# Patient Record
Sex: FEMALE | Race: WHITE | NOT HISPANIC OR LATINO
[De-identification: names, ages, dates, MRNs, and addresses within clinical notes are randomized per-mention and may not be internally consistent; named-entity substitution may affect disease eponyms.]

---

## 2019-03-06 ENCOUNTER — APPOINTMENT (OUTPATIENT)
Dept: GASTROENTEROLOGY | Facility: CLINIC | Age: 74
End: 2019-03-06
Payer: MEDICARE

## 2019-03-06 VITALS
HEIGHT: 60 IN | DIASTOLIC BLOOD PRESSURE: 82 MMHG | TEMPERATURE: 98.4 F | HEART RATE: 57 BPM | WEIGHT: 143 LBS | OXYGEN SATURATION: 99 % | BODY MASS INDEX: 28.07 KG/M2 | SYSTOLIC BLOOD PRESSURE: 160 MMHG

## 2019-03-06 PROCEDURE — 99213 OFFICE O/P EST LOW 20 MIN: CPT

## 2019-09-04 ENCOUNTER — APPOINTMENT (OUTPATIENT)
Dept: GASTROENTEROLOGY | Facility: CLINIC | Age: 74
End: 2019-09-04

## 2020-04-20 ENCOUNTER — APPOINTMENT (OUTPATIENT)
Dept: GASTROENTEROLOGY | Facility: CLINIC | Age: 75
End: 2020-04-20

## 2021-04-21 ENCOUNTER — APPOINTMENT (OUTPATIENT)
Dept: GASTROENTEROLOGY | Facility: CLINIC | Age: 76
End: 2021-04-21
Payer: MEDICARE

## 2021-04-21 VITALS
WEIGHT: 149 LBS | DIASTOLIC BLOOD PRESSURE: 79 MMHG | SYSTOLIC BLOOD PRESSURE: 156 MMHG | HEART RATE: 64 BPM | TEMPERATURE: 97.2 F | OXYGEN SATURATION: 98 % | HEIGHT: 60 IN | BODY MASS INDEX: 29.25 KG/M2

## 2021-04-21 DIAGNOSIS — Z01.818 ENCOUNTER FOR OTHER PREPROCEDURAL EXAMINATION: ICD-10-CM

## 2021-04-21 PROCEDURE — 99214 OFFICE O/P EST MOD 30 MIN: CPT

## 2021-04-21 RX ORDER — PANTOPRAZOLE 40 MG/1
40 TABLET, DELAYED RELEASE ORAL DAILY
Qty: 30 | Refills: 3 | Status: ACTIVE | COMMUNITY
Start: 2021-04-21 | End: 1900-01-01

## 2021-04-21 NOTE — ASSESSMENT
[FreeTextEntry1] : Dyspepsia: The patient complains of dyspeptic symptoms.  The patient was advised to abide by an anti-gas diet.  The patient was given a pamphlet for anti-gas.  The patient and I reviewed the anti-gas diet at length. The patient is to start on a trial of Phazyme one tablet 3 times a day p.r.n. abdominal pain and gas.\par Diarrhea: The patient complains of diarrhea.  I recommend a low residue diet. The patient is to avoid fiber supplementation. The patient is to consider starting a trial of a probiotic such as Align once a day.   If the symptoms persist, the patient may require sending stool studies for C+S, O+P x3, and C. difficile to assess for an infectious etiology of the diarrhea.  The symptoms are worse after meals.  The patient has a history of cholecystectomy.  I recommend a trial of cholestyramine one packet once a day for possible bile induced diarrhea. If the symptoms persist, the patient may require a colonoscopy to assess for colitis versus other causes.  The patient was told of the risks and benefits of the procedure.  The patient was told of the risks of perforation, emergency surgery, bleeding, infections and missed lesions.  The patient agreed and will follow-up to reassess the symptoms.  \par Diverticulosis: I recommend a low residue diet and avoid seeds. The patient is to consider a trial of Metamucil once a day for fiber supplementation. The patient is to also consider a trial of a probiotic such as Align once a day. \par Internal Hemorrhoids: The patient is to consider a trial of Anusol H. C. suppositories one per rectum nightly and Anusol HC2 .5% cream apply to affected area twice a day p.r.n. hemorrhoidal bleeding or pain. \par History of Colonic Polyps: The patient has a history of colonic polyps. I recommend a repeat colonoscopy in 2 years to reassess for colonic polyps unless symptomatic. The patient agreed and will follow up for the procedure. \par Gastritis: The patient has a history of gastritis. The patient is to avoid nonsteroidal anti-inflammatory drugs and aspirin. I recommend a trial of pantoprazole 40 mg once a day for 3 months for the symptoms. \par Intestinal Metaplasia of the Stomach:  The patient was found to have intestinal metaplasia of the stomach on prior upper endoscopy.  The findings of intestinal metaplasia of the stomach have an increased risk of gastric cancer.  Prior biopsies did not reveal dysplasia.  I recommend a repeat upper endoscopy with mapping to reassess for intestinal metaplasia and dysplasia unless symptomatic.  The patient is aware of the increased risk of intestinal metaplasia and progression to stomach cancer.  The patient agrees will followup .\par \par Reflux Esophagitis: The patient had reflux esophagitis noted on prior upper endoscopy. The patient was advised to avoid late-night meals and dietary indiscretions. The patient was advised to avoid fried and fatty foods. The patient was advised to abide by an anti-GERD diet. The patient was given a pamphlet for anti-GERD. The patient and I reviewed the anti-GERD diet at length. \par Gastric Polyps: The patient was found to have gastric polyps on recent upper endoscopy. The pathology revealed fundic gland polyps. The patient and I discussed the risks of fundic gland polyps. The patient was told him the possibility of increasing size and bleeding secondary to gastric polyps. The patient was advised to follow up in the office to reassess the gastric polyps. \par History of Iron Deficiency Anemia: The patient was previously found to have anemia on  prior blood work. The upper endoscopy and colonoscopy were previously inconclusive of the etiology of the anemia. The patient denies any bright red blood per rectum, melena or hematemesis. I recommend following to hemoglobin/hematocrit level. I recommend sending stool guaiac to assess for occult blood in the stool. The patient was also advised to follow up with her hematologist regarding the anemia for iron infusions. If the anemia recur, the patient may require a capsule endoscopy and possible double balloon enteroscopy to assess for obscure GI bleeding. \par Hiatal Hernia: The patient was advised to avoid late-night meals and dietary indiscretions. The patient was advised to avoid fried and fatty foods. The patient was advised to abide by an anti-GERD diet. The patient was given a pamphlet for anti-GERD. The patient and I reviewed the anti-GERD diet at length. \par Family History of GI Malignancy: The patient admits to a family history of GI problems. The patient’s father had a history of colon cancer. I recommend a repeat colonoscopy in 2 years to reassess for colonic polyps. The patient agreed and will follow up for the procedure. \par I recommend a repeat colonoscopy in 2 years to reassess for colonic polyps. The patient agreed and will follow up for the procedure.\par I recommend a repeat upper endoscopy to reassess for intestinal metaplasia and dysplasia. The patient agrees and will followup for the procedure. \par I recommend blood work to reassess the anemia in 3 months. The patient remains anemic and is being evaluated by Dr. Newton Torres of hematology. She previously underwent an iron infusion.\par Follow-up: The patient is to follow-up in the office in 3 months to reassess the symptoms. The patient was told to call the office if any further problems.\par \par \par

## 2021-04-21 NOTE — HISTORY OF PRESENT ILLNESS
Subjective:     Patient ID: Melvina Andres is a 66 y.o. female    HPI  Patient well known to me with past medical history as stated and reviewed with her.c/o neck pain today and several skin lesions to review. Has known osteoporosis on Boniva and viactiv chews  Very active  Walks bikes  swims      Review of Systems   Constitutional: Negative for activity change, appetite change, fatigue and fever. HENT: Negative for sore throat. Eyes: Negative for visual disturbance. Respiratory: Negative for cough, chest tightness and shortness of breath. Cardiovascular: Negative for chest pain, palpitations and leg swelling. Gastrointestinal: Negative for abdominal pain, constipation and diarrhea. Endocrine: Negative for cold intolerance. Genitourinary: Negative for dysuria and urgency. Musculoskeletal: Negative for back pain. Skin:        Several of concern to her   Neurological: Negative for dizziness, syncope and headaches. Hematological: Does not bruise/bleed easily. Psychiatric/Behavioral: Negative for confusion and sleep disturbance. The patient is not nervous/anxious. Objective:     Physical Exam  Constitutional:       Appearance: Normal appearance. HENT:      Head: Normocephalic. Right Ear: External ear normal.      Left Ear: External ear normal.      Nose: Nose normal.      Mouth/Throat:      Mouth: Mucous membranes are moist.      Pharynx: Oropharynx is clear. Eyes:      Conjunctiva/sclera: Conjunctivae normal.   Neck:      Musculoskeletal: Neck supple. Cardiovascular:      Rate and Rhythm: Normal rate and regular rhythm. Pulses: Normal pulses. Heart sounds: Normal heart sounds. No murmur. Pulmonary:      Effort: Pulmonary effort is normal.      Breath sounds: Normal breath sounds. Musculoskeletal: Normal range of motion. Right lower leg: No edema. Left lower leg: No edema.       Comments: Tender over facet joint at c5-6 with some paraspinal muscle [None] : had no significant interval events [Heartburn] : denies heartburn [Nausea] : denies nausea [Vomiting] : denies vomiting [Constipation] : denies constipation [Abdominal Pain] : denies abdominal pain [Yellow Skin Or Eyes (Jaundice)] : denies jaundice [Rectal Pain] : denies rectal pain [Diarrhea] : diarrhea [Abdominal Swelling] : abdominal swelling [Wt Gain ___ Lbs] : no recent weight gain [Wt Loss ___ Lbs] : no recent weight loss [GERD] : no gastroesophageal reflux disease [Hiatus Hernia] : no hiatus hernia [Peptic Ulcer Disease] : no peptic ulcer disease [Pancreatitis] : no pancreatitis [Cholelithiasis] : no cholelithiasis [Kidney Stone] : no kidney stone [Inflammatory Bowel Disease] : no inflammatory bowel disease [Irritable Bowel Syndrome] : no irritable bowel syndrome [Diverticulitis] : no diverticulitis [Alcohol Abuse] : no alcohol abuse [Malignancy] : no malignancy [Abdominal Surgery] : no abdominal surgery [Appendectomy] : no appendectomy [Cholecystectomy] : no cholecystectomy [de-identified] : The patient states that she is feeling fine. The patient denies any jaundice or pruritus.  The patient complains of chronic lower back pain. The patient denies any abdominal pain.  The patient complains of abdominal gas and bloating.  The patient denies any nausea or vomiting.  The patient denies any gastroesophageal reflux disease or dysphagia.  The patient denies any atypical chest pain, shortness of breath or palpitations.  The patient denies any diaphoresis. The patient complains of occasional diarrhea but denies any constipation.  The patient has 3 bowel movements a day.  The patient complains of a change in bowel habits.  The patient complains of a change in caliber of stool.   The diarrhea is described as soft in nature.  The patient denies having mucus discharge with the bowel movements.  The patient denies any bright red blood per rectum, melena or hematemesis.  The patient denies any rectal pain or rectal pruritus.  The patient denies any weight loss or anorexia.  She denies any fevers or chills.  The upper endoscopy performed in the office on July 27, 2018 revealed a hiatal hernia and mild diffuse bile gastritis with scattered small gastric polyps in the body of the stomach. The pathology revealed distal esophagus with squamous mucosa with changes suggestive of mild gastroesophageal reflux disease with no evidence of eosinophilic esophagitis, gastric antral mucosa with chronic gastritis with no evidence of intestinal metaplasia or dysplasia that was negative for Helicobacter pylori, gastric body mucosa with chronic gastritis with intestinal metaplasia but no evidence of dysplasia that was negative for Helicobacter pylori and duodenal mucosa with normal villous architecture, mild chronic inflammation of the lamina propia with no evidence of intraepithelial lymphocytosis, Celiac disease, Giardia or parasites. The colonoscopy to the terminal ileum performed at the office on July 03, 2018 revealed mild pandiverticulosis that was primarily concentrated to the left colon, a very long and tortuous colon and small internal hemorrhoids. There were no polyps, masses, AVMs or colitis noted. The pathology revealed colonic mucosa with mild nonspecific chronic inflammation of the lamina propria with no evidence of microscopic colitis. The patient tolerated the procedures well. The blood tests performed by her PMD on June 1, 2018 revealed an elevated AST/ALT of 33/36 U/L, respectively, and elevated triglyceride level of 204 mg/dL, mild anemia with a hemoglobin/hematocrit level of 10.2/33.1, respectively, and an elevated hemoglobin A1c of 6.5%. The CAT scan of the abdomen and pelvis with IV contrast performed on June 14, 2011 revealed no evidence of focal pancreatic mass, collections or duct dilatation. There were mildly enlarged sarah hepatis lymph nodes measuring up to 1.4 cm in size and likely reactive/postinflammatory in etiology. Also noted was a small fat containing ventral hernia. Stool studies performed on May 10, 2010 was negative for C. difficile toxin A. and B. The patient had a prior history of C. difficile colitis, s/p treatment with Flagyl 500 mg 3 times a day for 7 days for the C. difficile infection. The patient denies having a recent upper endoscopy and colonoscopy performed by another gastroenterologist. The patient admits to a family history of GI problems. The patient’s father had a history of colon cancer. [de-identified] : (-) smoking, (-) ETOH, (-) IVDA\par

## 2021-04-22 LAB — HEMOCCULT STL QL: NEGATIVE

## 2021-06-01 ENCOUNTER — APPOINTMENT (OUTPATIENT)
Dept: UROLOGY | Facility: CLINIC | Age: 76
End: 2021-06-01
Payer: MEDICARE

## 2021-06-01 ENCOUNTER — OUTPATIENT (OUTPATIENT)
Dept: OUTPATIENT SERVICES | Facility: HOSPITAL | Age: 76
LOS: 1 days | End: 2021-06-01
Payer: MEDICARE

## 2021-06-01 ENCOUNTER — APPOINTMENT (OUTPATIENT)
Dept: CT IMAGING | Facility: IMAGING CENTER | Age: 76
End: 2021-06-01
Payer: MEDICARE

## 2021-06-01 VITALS — RESPIRATION RATE: 17 BRPM | HEART RATE: 59 BPM | SYSTOLIC BLOOD PRESSURE: 161 MMHG | DIASTOLIC BLOOD PRESSURE: 72 MMHG

## 2021-06-01 DIAGNOSIS — R31.0 GROSS HEMATURIA: ICD-10-CM

## 2021-06-01 PROCEDURE — 74178 CT ABD&PLV WO CNTR FLWD CNTR: CPT | Mod: 26,MH

## 2021-06-01 PROCEDURE — 74178 CT ABD&PLV WO CNTR FLWD CNTR: CPT

## 2021-06-01 PROCEDURE — 99202 OFFICE O/P NEW SF 15 MIN: CPT

## 2021-06-01 NOTE — PHYSICAL EXAM
[General Appearance - Well Developed] : well developed [General Appearance - Well Nourished] : well nourished [Normal Appearance] : normal appearance [Well Groomed] : well groomed [General Appearance - In No Acute Distress] : no acute distress [Edema] : no peripheral edema [Respiration, Rhythm And Depth] : normal respiratory rhythm and effort [Exaggerated Use Of Accessory Muscles For Inspiration] : no accessory muscle use [Abdomen Soft] : soft [Abdomen Tenderness] : non-tender [Normal Station and Gait] : the gait and station were normal for the patient's age [] : no rash [No Focal Deficits] : no focal deficits [Oriented To Time, Place, And Person] : oriented to person, place, and time [Affect] : the affect was normal [Mood] : the mood was normal [Not Anxious] : not anxious [No Palpable Adenopathy] : no palpable adenopathy

## 2021-06-01 NOTE — REVIEW OF SYSTEMS
[Eyesight Problems] : eyesight problems [Abdominal Pain] : abdominal pain [Constipation] : constipation [Diarrhea] : diarrhea [Joint Pain] : joint pain [Difficulty Walking] : difficulty walking [Negative] : Heme/Lymph

## 2021-06-01 NOTE — HISTORY OF PRESENT ILLNESS
[FreeTextEntry1] : Ms. Foreman is a 76 year old female presenting today for right flank pain and hematuria.\par She reports that she noticed hematuria on Friday, 5/28/21. It lasted through Sunday 5/30/21. \par \par The patient produced a urine sample which will be sent for urinalysis and urine culture. \par \par I recommend that she goes for a CT Abdomen and Pelvis Urogram.\par I scheduled her for a cystoscopy for Monday 6/7/21. \par \par She will return to the office on Monday 6/7/21 for the cystoscopy and to review the CT.

## 2021-06-01 NOTE — ASSESSMENT
[FreeTextEntry1] : Ms. Foreman is a 76 year old female presenting today for right flank pain and hematuria.\par She reports that she noticed hematuria on Friday, 5/28/21. It lasted through Sunday 5/30/21. \par \par The patient produced a urine sample which will be sent for urinalysis and urine culture. \par \par I recommend that she goes for a CT Abdomen and Pelvis Urogram.\par I scheduled her for a cystoscopy for Monday 6/7/21. \par \par She will return to the office on Monday 6/7/21 for the cystoscopy and to review the CT.\par \par I spent 15 minutes with the patient.

## 2021-06-02 ENCOUNTER — NON-APPOINTMENT (OUTPATIENT)
Age: 76
End: 2021-06-02

## 2021-06-13 LAB
APPEARANCE: CLEAR
BACTERIA UR CULT: NORMAL
BACTERIA: NEGATIVE
BILIRUBIN URINE: NEGATIVE
BLOOD URINE: NEGATIVE
COLOR: COLORLESS
GLUCOSE QUALITATIVE U: NEGATIVE
HYALINE CASTS: 0 /LPF
KETONES URINE: NEGATIVE
LEUKOCYTE ESTERASE URINE: NEGATIVE
MICROSCOPIC-UA: NORMAL
NITRITE URINE: NEGATIVE
PH URINE: 6.5
PROTEIN URINE: NEGATIVE
RED BLOOD CELLS URINE: 1 /HPF
SPECIFIC GRAVITY URINE: 1.01
SQUAMOUS EPITHELIAL CELLS: 0 /HPF
UROBILINOGEN URINE: NORMAL
WHITE BLOOD CELLS URINE: 0 /HPF

## 2021-06-15 ENCOUNTER — APPOINTMENT (OUTPATIENT)
Dept: UROLOGY | Facility: CLINIC | Age: 76
End: 2021-06-15
Payer: MEDICARE

## 2021-06-15 ENCOUNTER — OUTPATIENT (OUTPATIENT)
Dept: OUTPATIENT SERVICES | Facility: HOSPITAL | Age: 76
LOS: 1 days | End: 2021-06-15
Payer: MEDICARE

## 2021-06-15 VITALS
DIASTOLIC BLOOD PRESSURE: 84 MMHG | RESPIRATION RATE: 16 BRPM | SYSTOLIC BLOOD PRESSURE: 151 MMHG | TEMPERATURE: 97.2 F | HEART RATE: 78 BPM

## 2021-06-15 DIAGNOSIS — E87.1 HYPO-OSMOLALITY AND HYPONATREMIA: ICD-10-CM

## 2021-06-15 DIAGNOSIS — R31.0 GROSS HEMATURIA: ICD-10-CM

## 2021-06-15 DIAGNOSIS — R79.89 OTHER SPECIFIED ABNORMAL FINDINGS OF BLOOD CHEMISTRY: ICD-10-CM

## 2021-06-15 DIAGNOSIS — R35.0 FREQUENCY OF MICTURITION: ICD-10-CM

## 2021-06-15 PROCEDURE — 99212 OFFICE O/P EST SF 10 MIN: CPT | Mod: 25

## 2021-06-15 PROCEDURE — 52000 CYSTOURETHROSCOPY: CPT

## 2021-06-15 RX ORDER — POTASSIUM CHLORIDE 1.5 G/1.58G
20 POWDER, FOR SOLUTION ORAL DAILY
Qty: 90 | Refills: 0 | Status: ACTIVE | COMMUNITY
Start: 2021-06-15 | End: 1900-01-01

## 2021-06-15 NOTE — ASSESSMENT
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

## 2021-06-15 NOTE — HISTORY OF PRESENT ILLNESS
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

## 2021-07-30 DIAGNOSIS — Z01.818 ENCOUNTER FOR OTHER PREPROCEDURAL EXAMINATION: ICD-10-CM

## 2021-07-31 ENCOUNTER — APPOINTMENT (OUTPATIENT)
Dept: DISASTER EMERGENCY | Facility: CLINIC | Age: 76
End: 2021-07-31

## 2021-08-01 LAB — SARS-COV-2 N GENE NPH QL NAA+PROBE: NOT DETECTED

## 2021-08-03 ENCOUNTER — OUTPATIENT (OUTPATIENT)
Dept: OUTPATIENT SERVICES | Facility: HOSPITAL | Age: 76
LOS: 1 days | End: 2021-08-03
Payer: MEDICARE

## 2021-08-03 ENCOUNTER — RESULT REVIEW (OUTPATIENT)
Age: 76
End: 2021-08-03

## 2021-08-03 ENCOUNTER — APPOINTMENT (OUTPATIENT)
Dept: GASTROENTEROLOGY | Facility: HOSPITAL | Age: 76
End: 2021-08-03

## 2021-08-03 DIAGNOSIS — K31.89 OTHER DISEASES OF STOMACH AND DUODENUM: ICD-10-CM

## 2021-08-03 LAB — GLUCOSE BLDC GLUCOMTR-MCNC: 121 MG/DL — HIGH (ref 70–99)

## 2021-08-03 PROCEDURE — 82962 GLUCOSE BLOOD TEST: CPT

## 2021-08-03 PROCEDURE — 43239 EGD BIOPSY SINGLE/MULTIPLE: CPT

## 2021-08-03 PROCEDURE — 88312 SPECIAL STAINS GROUP 1: CPT

## 2021-08-03 PROCEDURE — 88312 SPECIAL STAINS GROUP 1: CPT | Mod: 26

## 2021-08-03 PROCEDURE — 88305 TISSUE EXAM BY PATHOLOGIST: CPT | Mod: 26

## 2021-08-03 PROCEDURE — 88305 TISSUE EXAM BY PATHOLOGIST: CPT

## 2021-08-03 NOTE — CHART NOTE - NSCHARTNOTEFT_GEN_A_CORE
Esophagogastroduodenoscopy Report:    Indication:             Abdominal pain, dyspepsia, GERD, atypical chest pain, nausea/vomiting,  Referring MD:       Dr. Leonel Montgomery  Instrument:  #  Anesthesia:            MAC    Consent:  Informed consent was obtained from the patient after providing any opportunity for questions  Procedure: The gastroscope was gently passed through the incisoral orifice into the oral cavity and under direct visualization the esophagus was intubated. The endoscope was passed down the esophagus, through the stomach and into proximal jejunum. Color, texture, mucosa and anatomy of the esophagus, stomach, and duodenum were carefully examined with the scope. The patient tolerated the procedure well. After completion of the examination, the patient was transferred to the recovery room.     Procedure: Upper endoscopy and biopsies    Preparation: NPO     Findings:     Oropharynx:	   Normal appearing oropharynx.  Esophagus:	   Normal appearing esophagus with no ulcers, erosions, erythema noted.  Biopsy taken.  EG-junction:	   Normal appearing E-G junction at 35 cm. No hiatal hernia noted. No ulcers, erosions or erythema noted.  Cardia:	                 Normal appearing gastric mucosa with no ulcers, erosions or erythema noted. (+) Clear liquid suctioned.  Body:	                 Normal appearing gastric mucosa with no ulcers, erosions or erythema noted.  Biopsy taken.  Antrum:	                 Normal appearing gastric mucosa with no ulcers, erosions or erythema noted.  Biopsy taken.  Pylorus:	                 Normal appearing pylorus and pyloric channel with no ulcers, erosions or erythema noted.     Duodenal Bulb:         Normal appearing duodenal mucosa with no ulcers, erosions or erythema noted. Biopsy taken.  2nd portion:	   Normal appearing duodenal mucosa with no ulcers, erosions or erythema noted.  Biopsy taken.  3rd portion:	   Not visualized.      EBL:0    Impression: Small hiatal hernia, Mild diffuse bile gastritis    Plan:    1. Avoid late-night meals and dietary indiscretions.  2. Avoid fried and fatty foods.  3. Avoid nonsteroidal anti-inflammatory drugs and aspirin.  4. Will check path results.  5. I recommend a repeat upper endoscopy in 3 years to reassess for intestinal metaplasia and dysplasia unless symptomatic.  6. Recommend a trial of pantoprazole 40 mg once a day for 3 months.  6. Followup in office in 4 weeks to reassess the symptoms and discuss the findings.      Procedure Start Time:   Procedure End Time:         Attending:       Kalin Lobo M.D. Esophagogastroduodenoscopy Report:    Indication:             Abdominal pain, dyspepsia, GERD, atypical chest pain, nausea/vomiting,  Referring MD:       Dr. Leonel Montgomery  Instrument:  #  Anesthesia:            MAC    Consent:  Informed consent was obtained from the patient after providing any opportunity for questions  Procedure: The gastroscope was gently passed through the incisoral orifice into the oral cavity and under direct visualization the esophagus was intubated. The endoscope was passed down the esophagus, through the stomach and into proximal jejunum. Color, texture, mucosa and anatomy of the esophagus, stomach, and duodenum were carefully examined with the scope. The patient tolerated the procedure well. After completion of the examination, the patient was transferred to the recovery room.     Procedure: Upper endoscopy and biopsies    Preparation: NPO     Findings:     Oropharynx:	   Normal appearing oropharynx.  Esophagus:	   Normal appearing esophagus with no ulcers, erosions, erythema noted.  Biopsy taken.  EG-junction:	   Normal appearing E-G junction at 35 cm. No hiatal hernia noted. No ulcers, erosions or erythema noted.  Cardia:	                 Normal appearing gastric mucosa with no ulcers, erosions or erythema noted. (+) Clear liquid suctioned.  Biopsy taken of fundus of stomach.  Body:	                 Normal appearing gastric mucosa with no ulcers, erosions or erythema noted.  Biopsy taken of greater and lesser curvature of body.  Antrum:	                 Normal appearing gastric mucosa with no ulcers, erosions or erythema noted.  Biopsy taken of angularis, greater and lesser curvature of antrum.  Pylorus:	                 Normal appearing pylorus and pyloric channel with no ulcers, erosions or erythema noted.     Duodenal Bulb:         Normal appearing duodenal mucosa with no ulcers, erosions or erythema noted. Biopsy taken.  2nd portion:	   Normal appearing duodenal mucosa with no ulcers, erosions or erythema noted.  Biopsy taken.  3rd portion:	   Not visualized.      EBL:0    Impression: Small hiatal hernia, Mild diffuse bile gastritis    Plan:    1. Avoid late-night meals and dietary indiscretions.  2. Avoid fried and fatty foods.  3. Avoid nonsteroidal anti-inflammatory drugs and aspirin.  4. Will check path results.  5. I recommend a repeat upper endoscopy in 3 years to reassess for intestinal metaplasia and dysplasia unless symptomatic.  6. Recommend a trial of pantoprazole 40 mg once a day for 3 months.  6. Followup in office in 4 weeks to reassess the symptoms and discuss the findings.      Procedure Start Time:   Procedure End Time:         Attending:       Kalin Lobo M.D. Esophagogastroduodenoscopy Report:    Indication:             Abdominal pain, dyspepsia, GERD, atypical chest pain, nausea/vomiting,  Referring MD:       Dr. Leonel Montgomery  Instrument:  #          Anesthesia:            MAC    Consent:  Informed consent was obtained from the patient after providing any opportunity for questions  Procedure: The gastroscope was gently passed through the incisoral orifice into the oral cavity and under direct visualization the esophagus was intubated. The endoscope was passed down the esophagus, through the stomach and into proximal jejunum. Color, texture, mucosa and anatomy of the esophagus, stomach, and duodenum were carefully examined with the scope. The patient tolerated the procedure well. After completion of the examination, the patient was transferred to the recovery room.     Procedure: Upper endoscopy and biopsies    Preparation: NPO     Findings:     Oropharynx:	   Normal appearing oropharynx.  Esophagus:	   Normal appearing esophagus with no ulcers, erosions, erythema noted.  Biopsy taken.  EG-junction:	   Normal appearing E-G junction at 38 cm. (+) Small hiatal hernia noted. No ulcers, erosions or erythema noted.  Cardia:	                 Mild diffuse erythema suggestive of gastritis but no ulcers or erosions noted.  (+) Clear liquid suctioned.  Biopsy taken of fundus of stomach.  Body:	                 Mild diffuse erythema suggestive of gastritis but no ulcers or erosions noted. Biopsy taken of greater and lesser curvature of body.  Antrum:	                 Mild diffuse erythema suggestive of gastritis but no ulcers or erosions noted. Biopsy taken of angularis, greater and lesser curvature of antrum.  Pylorus:	                 Normal appearing pylorus and pyloric channel with no ulcers, erosions or erythema noted.     Duodenal Bulb:         Normal appearing duodenal mucosa with no ulcers, erosions or erythema noted. Biopsy taken.  2nd portion:	   Normal appearing duodenal mucosa with no ulcers, erosions or erythema noted.  Biopsy taken.  3rd portion:	   Not visualized.      EBL:0    Impression: 1. Small hiatal hernia 2. Mild diffuse gastritis    Plan:    1. Avoid late-night meals and dietary indiscretions.  2. Avoid fried and fatty foods.  3. Avoid nonsteroidal anti-inflammatory drugs and aspirin.  4. Will check path results.  5. I recommend a repeat upper endoscopy in 3 years to reassess for intestinal metaplasia and dysplasia unless symptomatic.  6. Recommend a trial of pantoprazole 40 mg once a day for 3 months.  7. Followup in office in 4 weeks to reassess the symptoms and discuss the findings.      Procedure Start Time:   9:14 am  Procedure End Time:    9:26 am      Attending:       Kalin Lobo M.D.

## 2021-08-05 LAB — SURGICAL PATHOLOGY STUDY: SIGNIFICANT CHANGE UP

## 2021-09-20 ENCOUNTER — APPOINTMENT (OUTPATIENT)
Dept: GASTROENTEROLOGY | Facility: CLINIC | Age: 76
End: 2021-09-20
Payer: MEDICARE

## 2021-09-20 VITALS
WEIGHT: 148 LBS | HEART RATE: 55 BPM | BODY MASS INDEX: 29.06 KG/M2 | DIASTOLIC BLOOD PRESSURE: 78 MMHG | OXYGEN SATURATION: 98 % | TEMPERATURE: 97.6 F | HEIGHT: 60 IN | SYSTOLIC BLOOD PRESSURE: 132 MMHG

## 2021-09-20 DIAGNOSIS — K29.30 CHRONIC SUPERFICIAL GASTRITIS W/OUT BLEEDING: ICD-10-CM

## 2021-09-20 PROCEDURE — 99215 OFFICE O/P EST HI 40 MIN: CPT

## 2021-09-20 RX ORDER — FAMOTIDINE 40 MG/1
40 TABLET, FILM COATED ORAL
Qty: 60 | Refills: 3 | Status: ACTIVE | COMMUNITY
Start: 2021-09-20 | End: 1900-01-01

## 2021-09-20 RX ORDER — ALLOPURINOL 100 MG/1
100 TABLET ORAL
Refills: 0 | Status: ACTIVE | COMMUNITY

## 2021-09-20 RX ORDER — HYDRALAZINE HYDROCHLORIDE 25 MG/1
25 TABLET ORAL 3 TIMES DAILY
Refills: 0 | Status: ACTIVE | COMMUNITY

## 2021-09-20 NOTE — HISTORY OF PRESENT ILLNESS
[None] : had no significant interval events [Heartburn] : denies heartburn [Nausea] : denies nausea [Vomiting] : denies vomiting [Constipation] : denies constipation [Yellow Skin Or Eyes (Jaundice)] : denies jaundice [Abdominal Pain] : denies abdominal pain [Rectal Pain] : denies rectal pain [Diarrhea] : diarrhea [Abdominal Swelling] : abdominal swelling [Wt Gain ___ Lbs] : no recent weight gain [Wt Loss ___ Lbs] : no recent weight loss [GERD] : no gastroesophageal reflux disease [Hiatus Hernia] : no hiatus hernia [Peptic Ulcer Disease] : no peptic ulcer disease [Pancreatitis] : no pancreatitis [Cholelithiasis] : no cholelithiasis [Kidney Stone] : no kidney stone [Inflammatory Bowel Disease] : no inflammatory bowel disease [Irritable Bowel Syndrome] : no irritable bowel syndrome [Diverticulitis] : no diverticulitis [Alcohol Abuse] : no alcohol abuse [Malignancy] : no malignancy [Abdominal Surgery] : no abdominal surgery [Appendectomy] : no appendectomy [Cholecystectomy] : no cholecystectomy [de-identified] : The patient states that she is feeling better since changing her diet. The patient denies any jaundice or pruritus.  The patient denies any chronic lower back pain. The patient denies any abdominal pain.  The patient complains of abdominal gas and bloating.  The patient denies any nausea or vomiting.  The patient denies any gastroesophageal reflux disease or dysphagia.  The patient denies any atypical chest pain, shortness of breath or palpitations.  The patient denies any diaphoresis. The patient complains of occasional diarrhea but denies any constipation.  The patient has 1 bowel movement every 1 to 2 days.  The patient denies a change in bowel habits.  The patient denies a change in caliber of stool.  The patient denies having mucus discharge with the bowel movements.  The patient complains of ?lower GI bleeding but denies any bright red blood per rectum, melena or hematemesis.  The lower GI bleeding is associated with hemorrhoids.  The patient denies any rectal pain or rectal pruritus.  The patient denies any weight loss or anorexia.  She denies any fevers or chills.  The patient had an upper endoscopy at the [facility] [place] [facility] suite on August 3, 2021. The upper endoscopy was performed up to the level of the second portion of the duodenum. The upper endoscopy revealed a small hiatal hernia and mild diffuse gastritis. Biopsies were taken of the distal esophagus, antrum, body of stomach and duodenum to assess for esophagitis, gastritis and duodenitis. 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  (Greater and lesser curvature of the antrum and body of the stomach, angularis and fundus). The patient tolerated the procedure well.  The CAT scan of the abdomen and pelvis with and without IV contrast performed on June 13, 2021 revealed no renal stones, renal masses or evidence of urotheial lesion and limited evaluation of the bladder.  Also noted was a tiny fat containing umbilical hernia and atherosclerotic changes.  The blood work performed on April 29, 2021 revealed an elevated blood glucose of 104 mg/dl, a low Sodium/Chloride level of 130/95 mmol/L, respectively, an elevated hemoglobin A1c of 6.2%, an abnormal liver enzymes with an elevated ALT of 38 U/L, a normal total bilirubin of 0.6 mg/dL, normal alkaline phosphatase/AST of 54/31 U/L, respectively, mild anemia with a hemoglobin/hematocrit level of 11.4/33.6, respectively and positive SARS COV 2 Antibody IgG spike of > 20.00 index. The upper endoscopy performed in the office on July 27, 2018 revealed a hiatal hernia and mild diffuse bile gastritis with scattered small gastric polyps in the body of the stomach. The pathology revealed distal esophagus with squamous mucosa with changes suggestive of mild gastroesophageal reflux disease with no evidence of eosinophilic esophagitis, gastric antral mucosa with chronic gastritis with no evidence of intestinal metaplasia or dysplasia that was negative for Helicobacter pylori, gastric body mucosa with chronic gastritis with intestinal metaplasia but no evidence of dysplasia that was negative for Helicobacter pylori and duodenal mucosa with normal villous architecture, mild chronic inflammation of the lamina propia with no evidence of intraepithelial lymphocytosis, Celiac disease, Giardia or parasites. The colonoscopy to the terminal ileum performed at the office on July 03, 2018 revealed mild pandiverticulosis that was primarily concentrated to the left colon, a very long and tortuous colon and small internal hemorrhoids. There were no polyps, masses, AVMs or colitis noted. The pathology revealed colonic mucosa with mild nonspecific chronic inflammation of the lamina propria with no evidence of microscopic colitis. The patient tolerated the procedures well. The blood tests performed by her PMD on June 1, 2018 revealed an elevated AST/ALT of 33/36 U/L, respectively, and elevated triglyceride level of 204 mg/dL, mild anemia with a hemoglobin/hematocrit level of 10.2/33.1, respectively, and an elevated hemoglobin A1c of 6.5%. The CAT scan of the abdomen and pelvis with IV contrast performed on June 14, 2011 revealed no evidence of focal pancreatic mass, collections or duct dilatation. There were mildly enlarged sarah hepatis lymph nodes measuring up to 1.4 cm in size and likely reactive/postinflammatory in etiology. Also noted was a small fat containing ventral hernia. Stool studies performed on May 10, 2010 was negative for C. difficile toxin A. and B. The patient had a prior history of C. difficile colitis, s/p treatment with Flagyl 500 mg 3 times a day for 7 days for the C. difficile infection. The patient denies having a recent upper endoscopy and colonoscopy performed by another gastroenterologist. The patient admits to a family history of GI problems. The patient’s father had a history of colon cancer.  [de-identified] : sei\par

## 2021-09-20 NOTE — ASSESSMENT
[FreeTextEntry1] : Dyspepsia: The patient complains of dyspeptic symptoms.  The patient was advised to abide by an anti-gas diet.  The patient was given a pamphlet for anti-gas.  The patient and I reviewed the anti-gas diet at length. The patient is to start on a trial of Phazyme one tablet 3 times a day p.r.n. abdominal pain and gas.\par Diverticulosis: I recommend a low residue diet and avoid seeds. The patient is to consider a trial of Metamucil once a day for fiber supplementation. The patient is to also consider a trial of a probiotic such as Align once a day. \par Internal Hemorrhoids: The patient is to consider a trial of Anusol H. C. suppositories one per rectum nightly and Anusol HC2.5% cream apply to affected area twice a day p.r.n. hemorrhoidal bleeding or pain. \par History of Colonic Polyps: The patient has a history of colonic polyps. I recommend a repeat colonoscopy in 2 years to reassess for colonic polyps unless symptomatic. The patient agreed and will follow up for the procedure. \par Gastritis: The patient has a history of gastritis. The patient is to avoid nonsteroidal anti-inflammatory drugs and aspirin. I recommend a trial of pantoprazole 40 mg once a day for 3 months for the symptoms. \par Intestinal Metaplasia of the Stomach: The patient was found to have intestinal metaplasia of the stomach on prior upper endoscopy. The findings of intestinal metaplasia of the stomach have an increased risk of gastric cancer. Prior biopsies did not reveal dysplasia. I recommend a repeat upper endoscopy in 3 years with mapping to reassess for intestinal metaplasia and dysplasia unless symptomatic. The patient is aware of the increased risk of intestinal metaplasia and progression to stomach cancer. The patient agrees will followup.\par Reflux Esophagitis: The patient had reflux esophagitis noted on prior upper endoscopy. The patient was advised to avoid late-night meals and dietary indiscretions. The patient was advised to avoid fried and fatty foods. The patient was advised to abide by an anti-GERD diet. The patient was given a pamphlet for anti-GERD. The patient and I reviewed the anti-GERD diet at length. \par Gastric Polyps: The patient was found to have gastric polyps on recent upper endoscopy. The pathology revealed fundic gland polyps. The patient and I discussed the risks of fundic gland polyps. The patient was told him the possibility of increasing size and bleeding secondary to gastric polyps. The patient was advised to follow up in the office to reassess the gastric polyps. \par History of Iron Deficiency Anemia: The patient was previously found to have anemia on prior blood work. The upper endoscopy and colonoscopy were previously inconclusive of the etiology of the anemia. The patient denies any bright red blood per rectum, melena or hematemesis. I recommend following to hemoglobin/hematocrit level. I recommend sending stool guaiac to assess for occult blood in the stool. The patient was also advised to follow up with her hematologist regarding the anemia for iron infusions. If the anemia recurs, the patient may require a capsule endoscopy and possible double balloon enteroscopy to assess for obscure GI bleeding. \par Hiatal Hernia: The patient was advised to avoid late-night meals and dietary indiscretions. The patient was advised to avoid fried and fatty foods. The patient was advised to abide by an anti-GERD diet. The patient was given a pamphlet for anti-GERD. The patient and I reviewed the anti-GERD diet at length. \par Family History of GI Malignancy: The patient admits to a family history of GI problems. The patient’s father had a history of colon cancer. I recommend a repeat colonoscopy in 2 years to reassess for colonic polyps. The patient agreed and will follow up for the procedure. \par I recommend a repeat colonoscopy in 2 years to reassess for colonic polyps unless symptoms. The patient agreed and will follow up for the procedure.\par I recommend blood work to reassess the anemia. The patient remains anemic and is being evaluated by Dr. Newton Torres of hematology. She previously underwent an iron infusion.\par I recommend a nutrionist evaluation for ?celiac disase.\par Blood Work: I recommend blood work to assess the patient's symptoms. I recommend a CBC, SMA 24, amylase, lipase, ESR, TFTs, MILAGROS, rheumatoid factor, celiac sprue panel, IgA, profile for hepatitis A, B, C. ,iron, TIBC, ferritin level and lipid profile.  I also recommend obtaining the recent blood work performed by the patient's PMD.\par Follow-up: The patient is to follow-up in the office in 6 months to reassess the symptoms. The patient was told to call the office if any further problems.\par

## 2021-09-22 ENCOUNTER — NON-APPOINTMENT (OUTPATIENT)
Age: 76
End: 2021-09-22

## 2021-09-22 LAB
ALBUMIN SERPL ELPH-MCNC: 4.7 G/DL
ALP BLD-CCNC: 61 U/L
ALT SERPL-CCNC: 42 U/L
AMYLASE/CREAT SERPL: 132 U/L
ANION GAP SERPL CALC-SCNC: 12 MMOL/L
AST SERPL-CCNC: 32 U/L
BASOPHILS # BLD AUTO: 0.06 K/UL
BASOPHILS NFR BLD AUTO: 1 %
BILIRUB SERPL-MCNC: 0.3 MG/DL
BUN SERPL-MCNC: 14 MG/DL
CALCIUM SERPL-MCNC: 10 MG/DL
CHLORIDE SERPL-SCNC: 91 MMOL/L
CO2 SERPL-SCNC: 24 MMOL/L
CREAT SERPL-MCNC: 0.77 MG/DL
EOSINOPHIL # BLD AUTO: 0.21 K/UL
EOSINOPHIL NFR BLD AUTO: 3.6 %
ERYTHROCYTE [SEDIMENTATION RATE] IN BLOOD BY WESTERGREN METHOD: 13 MM/HR
FERRITIN SERPL-MCNC: 113 NG/ML
GGT SERPL-CCNC: 63 U/L
GLIADIN IGA SER QL: <5 UNITS
GLIADIN IGG SER QL: <5 UNITS
GLIADIN PEPTIDE IGA SER-ACNC: NEGATIVE
GLIADIN PEPTIDE IGG SER-ACNC: NEGATIVE
GLUCOSE SERPL-MCNC: 102 MG/DL
HBV CORE IGG+IGM SER QL: NONREACTIVE
HBV CORE IGM SER QL: NONREACTIVE
HBV E AB SER QL: NONREACTIVE
HBV E AG SER QL: NONREACTIVE
HBV SURFACE AB SER QL: NONREACTIVE
HBV SURFACE AG SER QL: NONREACTIVE
HCT VFR BLD CALC: 37.2 %
HCV AB SER QL: NONREACTIVE
HCV S/CO RATIO: 0.16 S/CO
HEPATITIS A IGG ANTIBODY: NONREACTIVE
HGB BLD-MCNC: 12.2 G/DL
IGA SER QL IEP: 100 MG/DL
IMM GRANULOCYTES NFR BLD AUTO: 0.2 %
IRON SATN MFR SERPL: 25 %
IRON SERPL-MCNC: 93 UG/DL
LPL SERPL-CCNC: 46 U/L
LYMPHOCYTES # BLD AUTO: 2.02 K/UL
LYMPHOCYTES NFR BLD AUTO: 34.9 %
MAN DIFF?: NORMAL
MCHC RBC-ENTMCNC: 31.9 PG
MCHC RBC-ENTMCNC: 32.8 GM/DL
MCV RBC AUTO: 97.1 FL
MONOCYTES # BLD AUTO: 0.51 K/UL
MONOCYTES NFR BLD AUTO: 8.8 %
NEUTROPHILS # BLD AUTO: 2.98 K/UL
NEUTROPHILS NFR BLD AUTO: 51.5 %
PLATELET # BLD AUTO: 266 K/UL
POTASSIUM SERPL-SCNC: 4.4 MMOL/L
PROT SERPL-MCNC: 7.4 G/DL
RBC # BLD: 3.83 M/UL
RBC # FLD: 13.9 %
RHEUMATOID FACT SER QL: <10 IU/ML
SODIUM SERPL-SCNC: 127 MMOL/L
TIBC SERPL-MCNC: 367 UG/DL
TSH SERPL-ACNC: 1.1 UIU/ML
UIBC SERPL-MCNC: 275 UG/DL
WBC # FLD AUTO: 5.79 K/UL

## 2021-09-23 LAB
TTG IGA SER IA-ACNC: <1.2 U/ML
TTG IGA SER-ACNC: NEGATIVE
TTG IGG SER IA-ACNC: <1.2 U/ML
TTG IGG SER IA-ACNC: NEGATIVE

## 2021-09-30 ENCOUNTER — NON-APPOINTMENT (OUTPATIENT)
Age: 76
End: 2021-09-30

## 2022-07-06 ENCOUNTER — APPOINTMENT (OUTPATIENT)
Dept: GASTROENTEROLOGY | Facility: CLINIC | Age: 77
End: 2022-07-06

## 2022-07-06 VITALS
HEART RATE: 65 BPM | OXYGEN SATURATION: 98 % | BODY MASS INDEX: 28.47 KG/M2 | HEIGHT: 60 IN | DIASTOLIC BLOOD PRESSURE: 76 MMHG | SYSTOLIC BLOOD PRESSURE: 121 MMHG | WEIGHT: 145 LBS | TEMPERATURE: 97.3 F

## 2022-07-06 PROCEDURE — 99214 OFFICE O/P EST MOD 30 MIN: CPT

## 2022-07-06 RX ORDER — DICLOFENAC SODIUM 100 MG/1
100 TABLET, FILM COATED, EXTENDED RELEASE ORAL
Qty: 30 | Refills: 0 | Status: ACTIVE | COMMUNITY
Start: 2022-03-29

## 2022-07-06 RX ORDER — PREDNISONE 5 MG/1
5 TABLET ORAL
Qty: 50 | Refills: 0 | Status: ACTIVE | COMMUNITY
Start: 2022-05-03

## 2022-07-06 RX ORDER — LISINOPRIL 40 MG/1
40 TABLET ORAL
Qty: 90 | Refills: 0 | Status: ACTIVE | COMMUNITY
Start: 2022-01-26

## 2022-07-06 RX ORDER — FAMOTIDINE 40 MG/1
40 TABLET, FILM COATED ORAL
Qty: 60 | Refills: 3 | Status: ACTIVE | COMMUNITY
Start: 2022-07-06 | End: 1900-01-01

## 2022-07-06 RX ORDER — AZITHROMYCIN 250 MG/1
250 TABLET, FILM COATED ORAL
Qty: 10 | Refills: 0 | Status: ACTIVE | COMMUNITY
Start: 2021-04-21

## 2022-07-06 RX ORDER — SIMETHICONE 125 MG/1
125 TABLET, CHEWABLE ORAL
Qty: 168 | Refills: 2 | Status: ACTIVE | COMMUNITY
Start: 2022-07-06 | End: 1900-01-01

## 2022-07-06 RX ORDER — HYDRALAZINE HYDROCHLORIDE 50 MG/1
50 TABLET ORAL
Qty: 270 | Refills: 0 | Status: ACTIVE | COMMUNITY
Start: 2022-01-21

## 2022-07-06 RX ORDER — METHYLPREDNISOLONE 4 MG/1
4 TABLET ORAL
Qty: 21 | Refills: 0 | Status: ACTIVE | COMMUNITY
Start: 2022-03-08

## 2022-07-06 RX ORDER — CHOLESTYRAMINE 4 G/9G
4 POWDER, FOR SUSPENSION ORAL DAILY
Qty: 30 | Refills: 3 | Status: ACTIVE | COMMUNITY
Start: 2022-07-06 | End: 1900-01-01

## 2022-07-06 RX ORDER — NORTRIPTYLINE HYDROCHLORIDE 10 MG/1
10 CAPSULE ORAL
Qty: 30 | Refills: 0 | Status: ACTIVE | COMMUNITY
Start: 2022-06-15

## 2022-07-06 NOTE — ASSESSMENT
[FreeTextEntry1] : Dyspepsia: The patient complains of dyspeptic symptoms.  The patient was advised to continue to abide by an anti-gas (low FOD-MAP) diet.  The patient was previously given a pamphlet for anti-gas (low FOD-MAP).  The patient and I reviewed the anti-gas (low FOD-MAP)diet at length again. The patient is to continue on a trial of Simethicone one tablet 4 times a day p.r.n. abdominal pain and gas.\par GERD: The patient was advised to avoid late-night meals and dietary indiscretions.  The patient was advised to avoid fried and fatty foods.  The patient was advised to abide by an anti-GERD diet. The patient was given a pamphlet for anti-GERD.  The patient and I reviewed the anti-GERD diet at length. I recommend a trial of famotidine 40 mg twice a day x 3 months for the symptoms.\par Diarrhea: The patient complains of diarrhea.  I recommend a low residue diet. The patient is to avoid fiber supplementation. The patient is to consider starting a trial of a probiotic such as Align once a day.   I recommend a trial of cholestyramine one packet once a day for possible bile induced diarrhea. If the symptoms persist, the patient may require a colonoscopy to assess for colitis versus other causes.  The patient was told of the risks and benefits of the procedure.  The patient was told of the risks of perforation, emergency surgery, bleeding, infections and missed lesions.  The patient agreed and will follow-up to reassess the symptoms.  \par Diverticulosis: I recommend a low residue diet and avoid seeds. The patient is to consider a trial of Metamucil once a day for fiber supplementation. The patient is to also consider a trial of a probiotic such as Align once a day. \par Internal Hemorrhoids: The patient is to consider a trial of Anusol H. C. suppositories one per rectum nightly and Anusol HC2.5% cream apply to affected area twice a day p.r.n. hemorrhoidal bleeding or pain. \par History of Colonic Polyps: The patient has a history of colonic polyps. I recommend a repeat colonoscopy in 1 year to reassess for colonic polyps unless symptomatic. The patient agreed and will follow up for the procedure. \par Gastritis: The patient has a history of gastritis. The patient is to avoid nonsteroidal anti-inflammatory drugs and aspirin. I recommend a trial of pantoprazole 40 mg once a day for 3 months for the symptoms. \par Intestinal Metaplasia of the Stomach: The patient was found to have intestinal metaplasia of the stomach on prior upper endoscopy. The findings of intestinal metaplasia of the stomach have an increased risk of gastric cancer. Prior biopsies did not reveal dysplasia. I recommend a repeat upper endoscopy in 2 years with mapping to reassess for intestinal metaplasia and dysplasia unless symptomatic. The patient is aware of the increased risk of intestinal metaplasia and progression to stomach cancer. The patient agrees will followup.\par Reflux Esophagitis: The patient had reflux esophagitis noted on prior upper endoscopy. The patient was advised to avoid late-night meals and dietary indiscretions. The patient was advised to avoid fried and fatty foods. The patient was advised to abide by an anti-GERD diet. The patient was given a pamphlet for anti-GERD. The patient and I reviewed the anti-GERD diet at length. \par Gastric Polyps: The patient was found to have gastric polyps on recent upper endoscopy. The pathology revealed fundic gland polyps. The patient and I discussed the risks of fundic gland polyps. The patient was told of the possibility of increasing size and bleeding secondary to gastric polyps. The patient was advised to follow up in the office to reassess the gastric polyps. \par History of Iron Deficiency Anemia: The patient was previously found to have anemia on prior blood work. The upper endoscopy and colonoscopy were previously inconclusive of the etiology of the anemia. The patient denies any bright red blood per rectum, melena or hematemesis. I recommend following to hemoglobin/hematocrit level. I recommend sending stool guaiac to assess for occult blood in the stool. The patient was also advised to follow up with her hematologist regarding the anemia for iron infusions. If the anemia recurs, the patient may require a capsule endoscopy and possible double balloon enteroscopy to assess for obscure GI bleeding. \par Hiatal Hernia: The patient was advised to avoid late-night meals and dietary indiscretions. The patient was advised to avoid fried and fatty foods. The patient was advised to abide by an anti-GERD diet. The patient was given a pamphlet for anti-GERD. The patient and I reviewed the anti-GERD diet at length. \par Family History of GI Malignancy: The patient admits to a family history of GI problems. The patient’s father had a history of colon cancer. I recommend a repeat colonoscopy in 1 year to reassess for colonic polyps unless symptomatic. The patient agreed and will follow up for the procedure. \par I recommend a repeat colonoscopy in 1 year to reassess for colonic polyps unless symptoms. The patient agreed and will follow up for the procedure.\par I recommend blood work to reassess the anemia. The patient remains anemic and is being evaluated by Dr. Newton Torres of hematology. She previously underwent an iron infusion.\par I recommend a nutrionist evaluation for ?celiac disease.\par Follow-up: The patient is to follow-up in the office in 6 months to reassess the symptoms. The patient was told to call the office if any further problems.\par \par \par \par

## 2022-07-06 NOTE — HISTORY OF PRESENT ILLNESS
[None] : had no significant interval events [Nausea] : denies nausea [Vomiting] : denies vomiting [Constipation] : denies constipation [Yellow Skin Or Eyes (Jaundice)] : denies jaundice [Abdominal Pain] : denies abdominal pain [Rectal Pain] : denies rectal pain [Heartburn] : heartburn [Diarrhea] : diarrhea [Abdominal Swelling] : abdominal swelling [GERD] : gastroesophageal reflux disease [Wt Gain ___ Lbs] : no recent weight gain [Wt Loss ___ Lbs] : no recent weight loss [Hiatus Hernia] : no hiatus hernia [Peptic Ulcer Disease] : no peptic ulcer disease [Pancreatitis] : no pancreatitis [Cholelithiasis] : no cholelithiasis [Kidney Stone] : no kidney stone [Inflammatory Bowel Disease] : no inflammatory bowel disease [Irritable Bowel Syndrome] : no irritable bowel syndrome [Diverticulitis] : no diverticulitis [Alcohol Abuse] : no alcohol abuse [Malignancy] : no malignancy [Abdominal Surgery] : no abdominal surgery [Appendectomy] : no appendectomy [Cholecystectomy] : no cholecystectomy [de-identified] : The patient states that she is feeling uncomfortable x several years. The patient denies any jaundice or pruritus.  The patient denies any chronic lower back pain. The patient denies any abdominal pain.  The patient complains of abdominal gas and bloating.  The patient denies any nausea or vomiting.  The patient complains of gastroesophageal reflux disease but denies any dysphagia.  The gastroesophageal reflux disease is worse after meals and late at night and in the early morning. The gastroesophageal reflux disease is improved with proton pump inhibitors, H2 blockers and antacids.   The patient denies any atypical chest pain, shortness of breath or palpitations.  The patient denies any diaphoresis. The patient complains of diarrhea but denies any constipation.  The patient has 3 to 4 bowel movements a day. The diarrhea is described as soft to watery in nature.   The patient complains of a change in bowel habits.  The patient complains of a change in caliber of stool. The patient denies having mucus discharge with the bowel movements.  The patient denies any bright red denies any weight loss or anorexia.  .  She denies any fevers or chills.  The patient had an upper endoscopy at the [facility] endoscopy suite on August 3, 2021. The upper endoscopy was performed up to the level of the second portion of the duodenum. The upper endoscopy revealed a small hiatal hernia and mild diffuse gastritis. Biopsies were taken of the distal esophagus, antrum, body of stomach and duodenum to assess for esophagitis, gastritis and duodenitis. 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 (Greater and lesser curvature of the antrum and body of the stomach, angularis and fundus). The patient tolerated the procedure well. The CAT scan of the abdomen pelvis with and without IV contrast performed on July 13, 2021 revealed no renal stones, renal masses or evidence of urothelial lesions and limited evaluation of the bladder.  Status post hysterectomy, a tiny fat-containing umbilical hernia, degenerative changes of the bone and limited evaluation of the bladder secondary to streak artifact from bilateral hip prosthesis. The CAT scan of the abdomen and pelvis with and without IV contrast performed on June 13, 2021 revealed no renal stones, renal masses or evidence of urotheial lesion and limited evaluation of the bladder. Also noted was a tiny fat containing umbilical hernia and atherosclerotic changes.  The blood test performed on September 20, 2021 revealed an elevated amylase level of 132 U/L, a normal lipase level of 46 U/L, no evidence of anemia with a hemoglobin/hematocrit level of 12.2/37.2, respectively, a normal serum iron level of 93 mcg/dL, a normal TIBC/U IBC of 367/275 mcg/dL, respectively, a normal iron percent saturation of 25%, a normal ferritin level of 113 ng/mL, a low sodium/chloride level of 127/91 mmol/L, respectively, and elevated blood glucose of 102 mg/dL, abnormal liver enzymes with an elevated GGTP of 63 U/L, with a total bilirubin of 0.3 mg/dL, a normal alkaline phosphatase/AST/ALT/ of 61/32/42 U/L, respectively, a negative gliadin deamidated IgG antibody of < 5.0 Units, a negative gliadin deamidated IgA antibody of < 5.0 Units, a negative transglutaminase IgA antibody of < 1.2 U/ml, a negative transglutaminase IgG antibody of < 1.2 U/ml, a negative transglutaminase IgA antibody of < 1.2 U/ml, a normal serum IgA level of 100 mg/dL, a normal ESR of 13 mm/h.   The patient had blood test for Celiac disease performed to assess the symptoms. The blood test performed on September 20, 2021 revealed a negative gliadin deamidated IgG antibody of < 5.0 Units, a negative gliadin deamidated IgA antibody of < 5.0 Units, a negative transglutaminase IgA antibody of < 1.2 U/ml, a negative transglutaminase IgG antibody of < 1.2 U/ml, a negative transglutaminase IgA antibody of < 1.2 U/ml, a normal serum IgA level of 100 mg/dL. The blood work performed on April 29, 2021 revealed an elevated blood glucose of 104 mg/dl, a low Sodium/Chloride level of 130/95 mmol/L, respectively, an elevated hemoglobin A1c of 6.2%, an abnormal liver enzymes with an elevated ALT of 38 U/L, a normal total bilirubin of 0.6 mg/dL, normal alkaline phosphatase/AST of 54/31 U/L, respectively, mild anemia with a hemoglobin/hematocrit level of 11.4/33.6, respectively and positive SARS COV 2 Antibody IgG spike of > 20.00 index. The upper endoscopy performed in the office on July 27, 2018 revealed a hiatal hernia and mild diffuse bile gastritis with scattered small gastric polyps in the body of the stomach. The pathology revealed distal esophagus with squamous mucosa with changes suggestive of mild gastroesophageal reflux disease with no evidence of eosinophilic esophagitis, gastric antral mucosa with chronic gastritis with no evidence of intestinal metaplasia or dysplasia that was negative for Helicobacter pylori, gastric body mucosa with chronic gastritis with intestinal metaplasia but no evidence of dysplasia that was negative for Helicobacter pylori and duodenal mucosa with normal villous architecture, mild chronic inflammation of the lamina propia with no evidence of intraepithelial lymphocytosis, Celiac disease, Giardia or parasites. The colonoscopy to the terminal ileum performed at the office on July 03, 2018 revealed mild pandiverticulosis that was primarily concentrated to the left colon, a very long and tortuous colon and small internal hemorrhoids. There were no polyps, masses, AVMs or colitis noted. The pathology revealed colonic mucosa with mild nonspecific chronic inflammation of the lamina propria with no evidence of microscopic colitis. The patient tolerated the procedures well. The blood tests performed by her PMD on June 1, 2018 revealed an elevated AST/ALT of 33/36 U/L, respectively, and elevated triglyceride level of 204 mg/dL, mild anemia with a hemoglobin/hematocrit level of 10.2/33.1, respectively, and an elevated hemoglobin A1c of 6.5%. The CAT scan of the abdomen and pelvis with IV contrast performed on June 14, 2011 revealed no evidence of focal pancreatic mass, collections or duct dilatation. There were mildly enlarged sarah hepatis lymph nodes measuring up to 1.4 cm in size and likely reactive/postinflammatory in etiology. Also noted was a small fat containing ventral hernia. Stool studies performed on May 10, 2010 was negative for C. difficile toxin A. and B. The patient had a prior history of C. difficile colitis, s/p treatment with Flagyl 500 mg 3 times a day for 7 days for the C. difficile infection. The patient denies having a recent upper endoscopy and colonoscopy performed by another gastroenterologist. The patient admits to a family history of GI problems. The patient’s father had a history of colon cancer.  [de-identified] : (-) smoking, (-) ETOH, (-) IVDA\par

## 2022-10-31 ENCOUNTER — APPOINTMENT (OUTPATIENT)
Dept: PLASTIC SURGERY | Facility: CLINIC | Age: 77
End: 2022-10-31

## 2022-10-31 VITALS
OXYGEN SATURATION: 99 % | BODY MASS INDEX: 28.47 KG/M2 | HEART RATE: 65 BPM | SYSTOLIC BLOOD PRESSURE: 180 MMHG | TEMPERATURE: 97.5 F | WEIGHT: 145 LBS | DIASTOLIC BLOOD PRESSURE: 80 MMHG | HEIGHT: 60 IN

## 2022-10-31 DIAGNOSIS — M65.4 RADIAL STYLOID TENOSYNOVITIS [DE QUERVAIN]: ICD-10-CM

## 2022-10-31 DIAGNOSIS — M65.30 TRIGGER FINGER, UNSPECIFIED FINGER: ICD-10-CM

## 2022-10-31 DIAGNOSIS — M10.9 GOUT, UNSPECIFIED: ICD-10-CM

## 2022-10-31 DIAGNOSIS — M06.9 RHEUMATOID ARTHRITIS, UNSPECIFIED: ICD-10-CM

## 2022-10-31 DIAGNOSIS — M75.100 UNSPECIFIED ROTATOR CUFF TEAR OR RUPTURE OF UNSPECIFIED SHOULDER, NOT SPECIFIED AS TRAUMATIC: ICD-10-CM

## 2022-10-31 DIAGNOSIS — G56.03 CARPAL TUNNEL SYNDROM,BILATERAL UPPER LIMBS: ICD-10-CM

## 2022-10-31 PROCEDURE — 99203 OFFICE O/P NEW LOW 30 MIN: CPT

## 2022-11-01 PROBLEM — G56.03 BILATERAL CARPAL TUNNEL SYNDROME: Status: ACTIVE | Noted: 2022-11-01

## 2022-12-18 PROBLEM — M65.4 TENOSYNOVITIS, DE QUERVAIN: Status: ACTIVE | Noted: 2022-12-18

## 2022-12-18 NOTE — REVIEW OF SYSTEMS
[Fever] : no fever [Chills] : no chills [Negative] : Integumentary [FreeTextEntry9] : tender trigger and dequervain's [de-identified] : B/l numbness and tingling in the first three fingers

## 2022-12-18 NOTE — PHYSICAL EXAM
[NI] : Normal [de-identified] : +Tinels and Phalens sign bilaterally, +basal joint arthritis right thumb +grind test, +trigger finger III finger right hand +triggering and locking

## 2022-12-18 NOTE — REASON FOR VISIT
[Consultation] : a consultation visit [Friend] : friend [FreeTextEntry1] : Patient presents today for a consultation regarding B/L carpal tunnel wrist pain. Patient is RHD. She reports her symptoms began overnight in February and has been going to physical therapy until 2 months ago. Patient states she wears compression sleeves at night which provides some symptom relief. She reports numbness in the tips of all her fingers B/L (R>L today in office, but can vary day-to-day). She states lying on her sides to sleep makes her symptoms worse and is concerned her B/L rotator cuff tears may be related to her B/L carpal tunnel wrist pain. She requests an rx for occupational therapy.

## 2022-12-18 NOTE — HISTORY OF PRESENT ILLNESS
[FreeTextEntry1] : Pt presents here today to discuss bilateral wrist pain.  Pt is RHD.  States that the pain and numbness and tingling  began overnight in February.  Pt states she has been going to PT until about 2 months ago.  She states it has not really helped with her pain and numbness and tingling.  She wears compression sleeves at night which do provide her with some relief.  She states she has numbness in all her fingertips but states that the right is worse than left.  She states that if she lays on her side her symptoms seem to be worse.

## 2023-01-06 ENCOUNTER — APPOINTMENT (OUTPATIENT)
Dept: GASTROENTEROLOGY | Facility: CLINIC | Age: 78
End: 2023-01-06
Payer: MEDICARE

## 2023-01-06 VITALS
DIASTOLIC BLOOD PRESSURE: 69 MMHG | HEART RATE: 61 BPM | HEIGHT: 60 IN | OXYGEN SATURATION: 98 % | SYSTOLIC BLOOD PRESSURE: 118 MMHG | TEMPERATURE: 97.2 F | WEIGHT: 150 LBS | BODY MASS INDEX: 29.45 KG/M2

## 2023-01-06 DIAGNOSIS — R10.13 EPIGASTRIC PAIN: ICD-10-CM

## 2023-01-06 DIAGNOSIS — Z80.9 FAMILY HISTORY OF MALIGNANT NEOPLASM, UNSPECIFIED: ICD-10-CM

## 2023-01-06 DIAGNOSIS — K21.9 GASTRO-ESOPHAGEAL REFLUX DISEASE W/OUT ESOPHAGITIS: ICD-10-CM

## 2023-01-06 DIAGNOSIS — Z87.19 PERSONAL HISTORY OF OTHER DISEASES OF THE DIGESTIVE SYSTEM: ICD-10-CM

## 2023-01-06 DIAGNOSIS — K31.A0 GASTRIC INTESTINAL METAPLASIA, UNSPECIFIED: ICD-10-CM

## 2023-01-06 DIAGNOSIS — Z86.010 PERSONAL HISTORY OF COLONIC POLYPS: ICD-10-CM

## 2023-01-06 DIAGNOSIS — Z86.2 PERSONAL HISTORY OF DISEASES OF THE BLOOD AND BLOOD-FORMING ORGANS AND CERTAIN DISORDERS INVOLVING THE IMMUNE MECHANISM: ICD-10-CM

## 2023-01-06 DIAGNOSIS — R19.7 DIARRHEA, UNSPECIFIED: ICD-10-CM

## 2023-01-06 PROCEDURE — 99214 OFFICE O/P EST MOD 30 MIN: CPT

## 2023-01-06 RX ORDER — CHOLESTYRAMINE 4 G/9G
4 POWDER, FOR SUSPENSION ORAL DAILY
Qty: 30 | Refills: 3 | Status: ACTIVE | COMMUNITY
Start: 2023-01-06 | End: 1900-01-01

## 2023-01-06 RX ORDER — SIMETHICONE 125 MG/1
125 TABLET, CHEWABLE ORAL
Qty: 120 | Refills: 3 | Status: ACTIVE | COMMUNITY
Start: 2023-01-06 | End: 1900-01-01

## 2023-01-06 RX ORDER — FAMOTIDINE 40 MG/1
40 TABLET, FILM COATED ORAL
Qty: 60 | Refills: 3 | Status: ACTIVE | COMMUNITY
Start: 2023-01-06 | End: 1900-01-01

## 2023-01-06 NOTE — ASSESSMENT
[FreeTextEntry1] : Dyspepsia: The patient complains of dyspeptic symptoms.  The patient was advised to continue to abide by an anti-gas (low FOD-MAP) diet.  The patient was previously given a pamphlet for anti-gas (low FOD-MAP).  The patient and I reviewed the anti-gas (low FOD-MAP)diet at length again. The patient is to continue on a trial of Simethicone one tablet 4 times a day p.r.n. abdominal pain and gas.\par GERD: The patient was advised to avoid late-night meals and dietary indiscretions.  The patient was advised to avoid fried and fatty foods.  The patient was advised to abide by an anti-GERD diet. The patient was given a pamphlet for anti-GERD.  The patient and I reviewed the anti-GERD diet at length. I recommend a trial of famotidine 40 mg twice a day x 3 months for the symptoms.\par Atypical Chest Pain: The patient complains of atypical chest pain of unclear etiology.  The patient was advised to follow up with the PMD and cardiologist regarding evaluation for the atypical chest pain. The patient was told of possible etiologies such as cardiac, pulmonary, GI, musculoskeletal, stress and other causes for the atypical chest pain.  The patient agrees and will follow-up with the PMD and cardiologist. \par If the symptoms persist, the patient may require an upper endoscopy to assess for peptic ulcer disease versus esophagitis.  The patient was told of the risks and benefits of the procedure.  The patient was told of the risks of perforation, emergency surgery, bleeding, infections and missed lesions.  The patient agreed and will follow-up to reassess the symptoms.\par Diarrhea: The patient complains of diarrhea.  I recommend a low residue diet. The patient is to avoid fiber supplementation. The patient is to consider starting a trial of a probiotic such as Align once a day.   If the symptoms persist, the patient may require sending stool studies for C+S, O+P x3, and C. difficile to assess for an infectious etiology of the diarrhea.  The symptoms are worse after meals.  The patient has a history of cholecystectomy.  I recommend continue on a trial of cholestyramine 1/2 packet once a day for possible bile induced diarrhea. If the symptoms persist, the patient may require a colonoscopy to assess for colitis versus other causes.  The patient was told of the risks and benefits of the procedure.  The patient was told of the risks of perforation, emergency surgery, bleeding, infections and missed lesions.  The patient agreed and will follow-up to reassess the symptoms.  \par Diverticulosis: I recommend a low residue diet and avoid seeds. The patient is to consider a trial of Metamucil once a day for fiber supplementation. The patient is to also consider a trial of a probiotic such as Align once a day. \par Internal Hemorrhoids: The patient is to consider a trial of Anusol H. C. suppositories one per rectum nightly and Anusol HC2.5% cream apply to affected area twice a day p.r.n. hemorrhoidal bleeding or pain. \par History of Colonic Polyps: The patient has a history of colonic polyps. I recommend a repeat colonoscopy in 1 year to reassess for colonic polyps unless symptomatic. The patient agreed and will follow up for the procedure. \par Gastritis: The patient has a history of gastritis. The patient is to avoid nonsteroidal anti-inflammatory drugs and aspirin. I recommend a trial of pantoprazole 40 mg once a day for 3 months for the symptoms. \par Intestinal Metaplasia of the Stomach: The patient was found to have intestinal metaplasia of the stomach on prior upper endoscopy. The findings of intestinal metaplasia of the stomach have an increased risk of gastric cancer. Prior biopsies did not reveal dysplasia. I recommend a repeat upper endoscopy in 2 years with mapping to reassess for intestinal metaplasia and dysplasia unless symptomatic. The patient is aware of the increased risk of intestinal metaplasia and progression to stomach cancer. The patient agrees will followup.\par Reflux Esophagitis: The patient had reflux esophagitis noted on prior upper endoscopy. The patient was advised to avoid late-night meals and dietary indiscretions. The patient was advised to avoid fried and fatty foods. The patient was advised to abide by an anti-GERD diet. The patient was given a pamphlet for anti-GERD. The patient and I reviewed the anti-GERD diet at length. \par Gastric Polyps: The patient was found to have gastric polyps on recent upper endoscopy. The pathology revealed fundic gland polyps. The patient and I discussed the risks of fundic gland polyps. The patient was told of the possibility of increasing size and bleeding secondary to gastric polyps. The patient was advised to follow up in the office to reassess the gastric polyps. \par History of Iron Deficiency Anemia: The patient was previously found to have anemia on prior blood work. The upper endoscopy and colonoscopy were previously inconclusive of the etiology of the anemia. The patient denies any bright red blood per rectum, melena or hematemesis. I recommend following to hemoglobin/hematocrit level. I recommend sending stool guaiac to assess for occult blood in the stool. The patient was also advised to follow up with her hematologist regarding the anemia for iron infusions. If the anemia recurs, the patient may require a capsule endoscopy and possible double balloon enteroscopy to assess for obscure GI bleeding. \par Hiatal Hernia: The patient was advised to avoid late-night meals and dietary indiscretions. The patient was advised to avoid fried and fatty foods. The patient was advised to abide by an anti-GERD diet. The patient was given a pamphlet for anti-GERD. The patient and I reviewed the anti-GERD diet at length. \par Family History of GI Malignancy: The patient admits to a family history of GI problems. The patient’s father had a history of colon cancer. I recommend a repeat colonoscopy in 6 months to reassess for colonic polyps unless symptomatic. The patient agreed and will follow up for the procedure. \par I recommend a repeat colonoscopy in 6 months to reassess for colonic polyps unless symptoms. The patient agreed and will follow up for the procedure.\par I recommend blood work to reassess the anemia. The patient remains anemic and is being evaluated by Dr. Newton Torres of hematology. She previously underwent an iron infusion.\par I recommend a nutrionist evaluation for ?celiac disease.\par Follow-up: The patient is to follow-up in the office in 6 months to reassess the symptoms. The patient was told to call the office if any further problems.\par \par \par \par

## 2023-01-06 NOTE — HISTORY OF PRESENT ILLNESS
[FreeTextEntry1] : The colonoscopy to the terminal ileum performed at the office on July 03, 2018 revealed mild pandiverticulosis that was primarily concentrated to the left colon, a very long and tortuous colon and small internal hemorrhoids. There were no polyps, masses, AVMs or colitis noted. The pathology revealed colonic mucosa with mild nonspecific chronic inflammation of the lamina propria with no evidence of microscopic colitis. \par  [de-identified] : The CAT scan of the abdomen pelvis with and without IV contrast performed on July 13, 2021 revealed no renal stones, renal masses or evidence of urothelial lesions and limited evaluation of the bladder. Status post hysterectomy, a tiny fat-containing umbilical hernia, degenerative changes of the bone and limited evaluation of the bladder secondary to streak artifact from bilateral hip prosthesis. \par \par The CAT scan of the abdomen and pelvis with and without IV contrast performed on June 13, 2021 revealed no renal stones, renal masses or evidence of urotheial lesion and limited evaluation of the bladder. Also noted was a tiny fat containing umbilical hernia and atherosclerotic changes.\par \par The CAT scan of the abdomen and pelvis with IV contrast performed on June 14, 2011 revealed no evidence of focal pancreatic mass, collections or duct dilatation. There were mildly enlarged sarah hepatis lymph nodes measuring up to 1.4 cm in size and likely reactive/postinflammatory in etiology. Also noted was a small fat containing ventral hernia.\par

## 2023-02-23 ENCOUNTER — NON-APPOINTMENT (OUTPATIENT)
Age: 78
End: 2023-02-23

## 2023-02-24 ENCOUNTER — NON-APPOINTMENT (OUTPATIENT)
Age: 78
End: 2023-02-24

## 2023-10-02 ENCOUNTER — APPOINTMENT (OUTPATIENT)
Dept: GASTROENTEROLOGY | Facility: CLINIC | Age: 78
End: 2023-10-02

## 2024-04-19 ENCOUNTER — APPOINTMENT (OUTPATIENT)
Dept: VASCULAR SURGERY | Facility: CLINIC | Age: 79
End: 2024-04-19

## 2024-07-17 ENCOUNTER — APPOINTMENT (OUTPATIENT)
Dept: GASTROENTEROLOGY | Facility: CLINIC | Age: 79
End: 2024-07-17
Payer: MEDICARE

## 2024-07-17 VITALS
SYSTOLIC BLOOD PRESSURE: 151 MMHG | BODY MASS INDEX: 25.52 KG/M2 | HEART RATE: 65 BPM | TEMPERATURE: 97.2 F | OXYGEN SATURATION: 99 % | WEIGHT: 130 LBS | HEIGHT: 60 IN | DIASTOLIC BLOOD PRESSURE: 66 MMHG

## 2024-07-17 DIAGNOSIS — Z80.9 FAMILY HISTORY OF MALIGNANT NEOPLASM, UNSPECIFIED: ICD-10-CM

## 2024-07-17 DIAGNOSIS — K31.A0 GASTRIC INTESTINAL METAPLASIA, UNSPECIFIED: ICD-10-CM

## 2024-07-17 DIAGNOSIS — Z86.2 PERSONAL HISTORY OF DISEASES OF THE BLOOD AND BLOOD-FORMING ORGANS AND CERTAIN DISORDERS INVOLVING THE IMMUNE MECHANISM: ICD-10-CM

## 2024-07-17 DIAGNOSIS — R19.5 OTHER FECAL ABNORMALITIES: ICD-10-CM

## 2024-07-17 DIAGNOSIS — Z86.010 PERSONAL HISTORY OF COLONIC POLYPS: ICD-10-CM

## 2024-07-17 PROCEDURE — 99214 OFFICE O/P EST MOD 30 MIN: CPT

## 2024-07-17 RX ORDER — POLYETHYLENE GLYCOL 3350 AND ELECTROLYTES WITH LEMON FLAVOR 236; 22.74; 6.74; 5.86; 2.97 G/4L; G/4L; G/4L; G/4L; G/4L
236 POWDER, FOR SOLUTION ORAL
Qty: 1 | Refills: 0 | Status: ACTIVE | COMMUNITY
Start: 2024-07-17 | End: 1900-01-01

## 2024-11-06 ENCOUNTER — APPOINTMENT (OUTPATIENT)
Dept: GASTROENTEROLOGY | Facility: CLINIC | Age: 79
End: 2024-11-06

## 2025-02-14 ENCOUNTER — APPOINTMENT (OUTPATIENT)
Dept: GASTROENTEROLOGY | Facility: CLINIC | Age: 80
End: 2025-02-14
Payer: MEDICARE

## 2025-02-14 VITALS
OXYGEN SATURATION: 98 % | HEART RATE: 59 BPM | SYSTOLIC BLOOD PRESSURE: 130 MMHG | BODY MASS INDEX: 25.52 KG/M2 | DIASTOLIC BLOOD PRESSURE: 68 MMHG | WEIGHT: 130 LBS | TEMPERATURE: 97 F | HEIGHT: 60 IN

## 2025-02-14 DIAGNOSIS — K31.A0 GASTRIC INTESTINAL METAPLASIA, UNSPECIFIED: ICD-10-CM

## 2025-02-14 DIAGNOSIS — Z86.2 PERSONAL HISTORY OF DISEASES OF THE BLOOD AND BLOOD-FORMING ORGANS AND CERTAIN DISORDERS INVOLVING THE IMMUNE MECHANISM: ICD-10-CM

## 2025-02-14 DIAGNOSIS — Z86.0100 PERSONAL HISTORY OF COLON POLYPS, UNSPECIFIED: ICD-10-CM

## 2025-02-14 DIAGNOSIS — Z80.9 FAMILY HISTORY OF MALIGNANT NEOPLASM, UNSPECIFIED: ICD-10-CM

## 2025-02-14 DIAGNOSIS — I77.0 ARTERIOVENOUS FISTULA, ACQUIRED: ICD-10-CM

## 2025-02-14 DIAGNOSIS — R19.5 OTHER FECAL ABNORMALITIES: ICD-10-CM

## 2025-02-14 PROCEDURE — 99214 OFFICE O/P EST MOD 30 MIN: CPT

## 2025-02-14 RX ORDER — RITUXIMAB-ABBS 10 MG/ML
INJECTION, SOLUTION INTRAVENOUS
Refills: 0 | Status: ACTIVE | COMMUNITY

## 2025-02-14 RX ORDER — NIFEDIPINE 90 MG
90 TABLET, EXTENDED RELEASE ORAL
Refills: 0 | Status: ACTIVE | COMMUNITY

## 2025-02-14 RX ORDER — ERYTHROPOIETIN 10000 [IU]/ML
10000 INJECTION, SOLUTION INTRAVENOUS; SUBCUTANEOUS
Refills: 0 | Status: ACTIVE | COMMUNITY

## 2025-02-14 RX ORDER — ROSUVASTATIN CALCIUM 5 MG/1
5 TABLET, FILM COATED ORAL
Refills: 0 | Status: ACTIVE | COMMUNITY

## 2025-02-14 RX ORDER — ISOSORBIDE MONONITRATE 60 MG/1
60 TABLET, EXTENDED RELEASE ORAL
Refills: 0 | Status: ACTIVE | COMMUNITY

## 2025-02-14 RX ORDER — DAPAGLIFLOZIN 10 MG/1
10 TABLET, FILM COATED ORAL
Refills: 0 | Status: ACTIVE | COMMUNITY

## 2025-02-14 RX ORDER — LEVOTHYROXINE SODIUM 0.07 MG/1
75 TABLET ORAL
Refills: 0 | Status: ACTIVE | COMMUNITY

## 2025-02-14 RX ORDER — POLYETHYLENE GLYCOL 3350 AND ELECTROLYTES WITH LEMON FLAVOR 236; 22.74; 6.74; 5.86; 2.97 G/4L; G/4L; G/4L; G/4L; G/4L
236 POWDER, FOR SOLUTION ORAL
Qty: 1 | Refills: 0 | Status: ACTIVE | COMMUNITY
Start: 2025-02-14 | End: 1900-01-01

## 2025-02-14 RX ORDER — LOSARTAN POTASSIUM 25 MG/1
25 TABLET, FILM COATED ORAL
Refills: 0 | Status: ACTIVE | COMMUNITY

## 2025-02-18 ENCOUNTER — NON-APPOINTMENT (OUTPATIENT)
Age: 80
End: 2025-02-18

## 2025-02-18 LAB
ALBUMIN SERPL ELPH-MCNC: 4.8 G/DL
ALP BLD-CCNC: 119 U/L
ALT SERPL-CCNC: 15 U/L
ANION GAP SERPL CALC-SCNC: 15 MMOL/L
AST SERPL-CCNC: 17 U/L
BILIRUB SERPL-MCNC: 0.3 MG/DL
BUN SERPL-MCNC: 41 MG/DL
CALCIUM SERPL-MCNC: 9.8 MG/DL
CEA SERPL-MCNC: 2.4 NG/ML
CHLORIDE SERPL-SCNC: 104 MMOL/L
CO2 SERPL-SCNC: 22 MMOL/L
CREAT SERPL-MCNC: 2.43 MG/DL
EGFR: 20 ML/MIN/1.73M2
ERYTHROCYTE [SEDIMENTATION RATE] IN BLOOD BY WESTERGREN METHOD: 26 MM/HR
FERRITIN SERPL-MCNC: 497 NG/ML
GGT SERPL-CCNC: 43 U/L
GLUCOSE SERPL-MCNC: 105 MG/DL
HCT VFR BLD CALC: 41.2 %
HGB BLD-MCNC: 13.4 G/DL
IRON SATN MFR SERPL: 18 %
IRON SERPL-MCNC: 49 UG/DL
LPL SERPL-CCNC: 43 U/L
MCHC RBC-ENTMCNC: 30.2 PG
MCHC RBC-ENTMCNC: 32.5 G/DL
MCV RBC AUTO: 93 FL
PLATELET # BLD AUTO: 279 K/UL
POTASSIUM SERPL-SCNC: 4.5 MMOL/L
PROT SERPL-MCNC: 7.1 G/DL
RBC # BLD: 4.43 M/UL
RBC # FLD: 13.9 %
SODIUM SERPL-SCNC: 141 MMOL/L
TIBC SERPL-MCNC: 272 UG/DL
UIBC SERPL-MCNC: 223 UG/DL
WBC # FLD AUTO: 6.23 K/UL

## 2025-03-04 ENCOUNTER — OUTPATIENT (OUTPATIENT)
Dept: OUTPATIENT SERVICES | Facility: HOSPITAL | Age: 80
LOS: 1 days | End: 2025-03-04
Payer: MEDICARE

## 2025-03-04 ENCOUNTER — TRANSCRIPTION ENCOUNTER (OUTPATIENT)
Age: 80
End: 2025-03-04

## 2025-03-04 ENCOUNTER — APPOINTMENT (OUTPATIENT)
Dept: GASTROENTEROLOGY | Facility: HOSPITAL | Age: 80
End: 2025-03-04

## 2025-03-04 ENCOUNTER — RESULT REVIEW (OUTPATIENT)
Age: 80
End: 2025-03-04

## 2025-03-04 VITALS
RESPIRATION RATE: 12 BRPM | TEMPERATURE: 98 F | SYSTOLIC BLOOD PRESSURE: 172 MMHG | WEIGHT: 130.07 LBS | DIASTOLIC BLOOD PRESSURE: 65 MMHG | HEART RATE: 72 BPM | OXYGEN SATURATION: 99 %

## 2025-03-04 VITALS
RESPIRATION RATE: 15 BRPM | OXYGEN SATURATION: 98 % | HEART RATE: 62 BPM | DIASTOLIC BLOOD PRESSURE: 93 MMHG | SYSTOLIC BLOOD PRESSURE: 137 MMHG

## 2025-03-04 DIAGNOSIS — Z98.890 OTHER SPECIFIED POSTPROCEDURAL STATES: Chronic | ICD-10-CM

## 2025-03-04 DIAGNOSIS — I77.0 ARTERIOVENOUS FISTULA, ACQUIRED: Chronic | ICD-10-CM

## 2025-03-04 DIAGNOSIS — Z86.010 PERSONAL HISTORY OF COLON POLYPS: ICD-10-CM

## 2025-03-04 LAB — GLUCOSE BLDC GLUCOMTR-MCNC: 92 MG/DL — SIGNIFICANT CHANGE UP (ref 70–99)

## 2025-03-04 PROCEDURE — 88305 TISSUE EXAM BY PATHOLOGIST: CPT

## 2025-03-04 PROCEDURE — 82962 GLUCOSE BLOOD TEST: CPT

## 2025-03-04 PROCEDURE — 88312 SPECIAL STAINS GROUP 1: CPT | Mod: 26

## 2025-03-04 PROCEDURE — 43239 EGD BIOPSY SINGLE/MULTIPLE: CPT

## 2025-03-04 PROCEDURE — 88305 TISSUE EXAM BY PATHOLOGIST: CPT | Mod: 26

## 2025-03-04 PROCEDURE — 45385 COLONOSCOPY W/LESION REMOVAL: CPT

## 2025-03-04 PROCEDURE — 88312 SPECIAL STAINS GROUP 1: CPT

## 2025-03-04 PROCEDURE — 45385 COLONOSCOPY W/LESION REMOVAL: CPT | Mod: PT

## 2025-03-04 RX ADMIN — Medication 30 MILLILITER(S): at 08:18

## 2025-03-04 NOTE — ASU DISCHARGE PLAN (ADULT/PEDIATRIC) - FINANCIAL ASSISTANCE
[facility] provides services at a reduced cost to those who are determined to be eligible through [facility]’s financial assistance program. Information regarding [facility]’s financial assistance program can be found by going to [email or web address] or by calling [phone].

## 2025-03-04 NOTE — CHART NOTE - NSCHARTNOTEFT_GEN_A_CORE
History of Present Illness       The patient is an 80-year-old female with a past medical history significant for hypertension, hypercholesterolemia, diabetes mellitus and chronic renal insufficiency who was referred to the office by her PMD, Dr. Marge Levi for a history of anemia.  The patient has a history of the gastric intestinal metaplasia.  The patient admits to a family history of GI problems. The patient's father had a history of colon cancer.  The patient states that she is feeling better. The patient was told of a (+) Cologuard test. The patient denies any jaundice or pruritus. The patient denies any chronic lower back pain. The patient denies any abdominal pain. The patient denies any abdominal gas and bloating. The patient denies any nausea or vomiting. The patient denies any gastroesophageal reflux disease or dysphagia. The patient denies any atypical chest pain, shortness of breath or palpitations. The patient denies any diaphoresis. The patient denies any constipation or diarrhea. The patient has 1 bowel movement every 1 to 2 days. The patient denies a change in bowel habits. The patient denies a change in caliber of stool. The patient denies having mucus discharge with the bowel movements. The patient denies any bright red blood per rectum, melena or hematemesis. The patient denies any rectal pain or rectal pruritus. The patient currently denies any weight loss or anorexia. The patient previously complained of weight loss but denied any anorexia. The patient admitted to losing 25 pounds over 12 months. The patient attributed the weight loss to dialysis. She denies any fevers or chills. The patient had an upper endoscopy at the [facility] [place] GI endoscopy suite on August 3, 2021. The upper endoscopy was performed up to the level of the second portion of the duodenum. The upper endoscopy revealed a small hiatal hernia and mild diffuse gastritis. Biopsies were taken of the distal esophagus, antrum, body of stomach and duodenum to assess for esophagitis, gastritis and duodenitis. 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 (greater and lesser curvature of the antrum and body of the stomach, angularis and fundus). The patient tolerated the procedure well. The colonoscopy to the terminal ileum performed at the office on July 03, 2018 revealed mild pandiverticulosis that was primarily concentrated to the left colon, a very long and tortuous colon and small internal hemorrhoids. There were no polyps, masses, AVMs or colitis noted. The pathology revealed colonic mucosa with mild nonspecific chronic inflammation of the lamina propria with no evidence of microscopic colitis. The patient tolerated the procedure well. The CAT scan of the abdomen and pelvis with and without IV contrast performed on July 13, 2021 revealed no renal stones, renal masses or evidence of urothelial lesions and limited evaluation of the bladder. Status post hysterectomy, a tiny fat-containing umbilical hernia, degenerative changes of the bone and limited evaluation of the bladder secondary to streak artifact from bilateral hip prosthesis. The blood work performed on February 14, 2025 revealed a normal CEA level of 2.4 ng/mL, a normal WBC count of 6.23 K/UL, no evidence of anemia with a hemoglobin/hematocrit level 13.4/41.2, respectively, a normal platelet count of 279,000, and elevated blood glucose of 105 mg/dL, and elevated BUN/creatinine level of 41/2.43 mg/dL, respectively, a low GFR of 20 mL/min per 1.73 m, and elevated GGTP of 43 U/L, a normal total bilirubin of 0.3 mg/dL, a normal alkaline phosphatase/AST/ALT of 119/17/15 U/L, respectively, a normal serum iron level of 49 mcg/dL, a normal TIBC/UIBC of 272/223 mcg/dL, respectively, a normal iron percent saturation of 18%, and elevated ferritin level of 497 ng/mL, a normal lipase level of 43 U/L and an elevated ESR of 26 mm/h.  The blood work performed on June 17, 2024 revealed anemia with a hemoglobin/hematocrit level of 11.0/33.0, respectively, the blood work performed on June 14, 2024 revealed anemia with a hemoglobin/hematocrit level of 10.7/32.1, respectively, a normal WBC count of 6.25 K/UL, a normal platelet count of 193,000, and elevated BUN/creatinine level of 65/2.77 mg/dL, respectively, and elevated phosphorus level of 5.0 mg/dL, and elevated intact plasma PTH of 358 pg/mL, normal liver enzymes with an alkaline phosphatase/ALT of 73/14 U/L, respectively, and elevated phosphorus level of 5.0 mg/dL, The blood test for Celiac disease performed on September 20, 2021 revealed a negative gliadin deamidated IgG antibody of < 5.0 Units, a negative gliadin deamidated IgA antibody of < 5.0 Units, a negative transglutaminase IgA antibody of < 1.2 U/ml, a negative transglutaminase IgG antibody of < 1.2 U/ml, a negative transglutaminase IgA antibody of < 1.2 U/ml, a normal serum IgA level of 100 mg/dL. The patient had a prior history of C. difficile colitis, s/p treatment with Flagyl 500 mg 3 times a day for 7 days for the C. difficile infection. The patient admits to a family history of GI problems. The patient's father had a history of colon cancer.  ?  (-) smoking, (-) ETOH, (-) IVDA  ?  Physical Exam:  General Appearance: no acute distress  ENT: nose clear, ears unremarkable  Eyes: No enteric sclera, conjunctiva clear.  Neck: Supple, without masses  Respiratory: Breath sounds equal and bilateral, no wheezing no rales or rhonchi  Cardiovascular: S1-S2 audible, no murmur, no rubs or gallops  GI: (+) BS, soft, nontender, no rebound, no guarding, no masses  Liver: liver edge palpated  Musculo-skeletal: Good motor strength, good range of motion, normal appearing extremities  Skin: Normal appearing skin, no jaundice, no rashes or nodules  Neurological: without focal motor or sensory deficits Patient is moving all extremities spontaneously and to command with normal muscle strength alert and oriented X3  Psychiatric: Good affect, not depressed, not anxious      Gastroenterology Summary    Upper Endoscopy: The upper endoscopy at the [facility] GI endoscopy suite on August 3, 2021 revealed a small hiatal hernia and mild diffuse gastritis. 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 (greater and lesser curvature of the antrum and body of the stomach, angularis and fundus).  ?  The upper endoscopy performed in the office on July 27, 2018 revealed a hiatal hernia and mild diffuse bile gastritis with scattered small gastric polyps in the body of the stomach. The pathology revealed distal esophagus with squamous mucosa with changes suggestive of mild gastroesophageal reflux disease with no evidence of eosinophilic esophagitis, gastric antral mucosa with chronic gastritis with no evidence of intestinal metaplasia or dysplasia that was negative for Helicobacter pylori, gastric body mucosa with chronic gastritis with intestinal metaplasia but no evidence of dysplasia that was negative for Helicobacter pylori and duodenal mucosa with normal villous architecture, mild chronic inflammation of the lamina propia with no evidence of intraepithelial lymphocytosis, Celiac disease, Giardia or parasites.    Colonoscopy: The colonoscopy to the terminal ileum performed at the office on July 03, 2018 revealed mild pandiverticulosis that was primarily concentrated to the left colon, a very long and tortuous colon and small internal hemorrhoids. There were no polyps, masses, AVMs or colitis noted. The pathology revealed colonic mucosa with mild nonspecific chronic inflammation of the lamina propria with no evidence of microscopic colitis.      Radiology Summary    CT: The CAT scan of the abdomen pelvis with and without IV contrast performed on July 13, 2021 revealed no renal stones, renal masses or evidence of urothelial lesions and limited evaluation of the bladder. Status post hysterectomy, a tiny fat-containing umbilical hernia, degenerative changes of the bone and limited evaluation of the bladder secondary to streak artifact from bilateral hip prosthesis.  ?  The CAT scan of the abdomen and pelvis with and without IV contrast performed on June 13, 2021 revealed no renal stones, renal masses or evidence of urotheial lesion and limited evaluation of the bladder. Also noted was a tiny fat containing umbilical hernia and atherosclerotic changes.  ?  The CAT scan of the abdomen and pelvis with IV contrast performed on June 14, 2011 revealed no evidence of focal pancreatic mass, collections or duct dilatation. There were mildly enlarged sarah hepatis lymph nodes measuring up to 1.4 cm in size and likely reactive/postinflammatory in etiology. Also noted was a small fat containing ventral hernia.  ?  Active Problems  Bilateral carpal tunnel syndrome (354.0) (G56.03)  Chronic superficial gastritis without bleeding (535.10) (K29.30)  Diarrhea, unspecified type (787.91) (R19.7)  Dyspepsia (536.8) (R10.13)  Family history of cancer (V16.9) (Z80.9)  GERD without esophagitis (530.81) (K21.9)  Gout (274.9) (M10.9)  Gross hematuria (599.71) (R31.0)  History of anemia (V12.3) (Z86.2)  History of colon polyps (V12.72) (Z86.0100)  History of hiatal hernia (V12.79) (Z87.19)  Intestinal metaplasia of gastric mucosa (537.89) (K31.A0)  Low sodium levels (276.1) (E87.1)  Mass of hand (782.2) (R22.30)  Osteoarthritis (715.90) (M19.90)  Positive colorectal cancer screening using Cologuard test (787.7) (R19.5)  Pre-op testing (V72.84) (Z01.818)  Preoperative examination (V72.84) (Z01.818)  Rheumatoid arthritis (714.0) (M06.9)  Rotator cuff tear (840.4) (M75.100)  Tenosynovitis, de Quervain (727.04) (M65.4)  Trigger finger (727.03) (M65.30)           ?  Past Medical History  Unreviewed Unverified: History of Arteriovenous fistula (447.0) (I77.0)  History of Elevated uric acid in blood (790.6) (E79.0)  History of diabetes mellitus (V12.29) (Z86.39)  History of hypertension (V12.59) (Z86.79)  History of hypothyroidism (V12.29) (Z86.39)  History of Reflux           ?  Current Meds  Aciphex 20 MG Oral Tablet Delayed Release  Allopurinol 100 MG Oral Tablet  Allopurinol 300 MG Oral Tablet  Azithromycin 250 MG Oral Tablet  Cholestyramine 4 GM Oral Packet; TAKE 4 GM Daily  Cholestyramine 4 GM Oral Packet; TAKE 4 GM Daily  Diclofenac Sodium  MG Oral Tablet Extended Release 24 Hour  Famotidine 40 MG Oral Tablet; TAKE 1 TABLET BY MOUTH TWICE A DAY  Famotidine 40 MG Oral Tablet; Take 1 tablet twice daily  Famotidine 40 MG Oral Tablet; Take 1 tablet twice daily  Farxiga 10 MG Oral Tablet  hydrALAZINE HCl - 25 MG Oral Tablet; TAKE 1 TABLET 3 TIMES DAILY  hydrALAZINE HCl - 50 MG Oral Tablet  Isosorbide Mononitrate ER 60 MG Oral Tablet Extended Release 24 Hour  Levothyroxine Sodium 75 MCG Oral Tablet  Lisinopril 40 MG Oral Tablet  Lisinopril-hydroCHLOROthiazide 20-12.5 MG Oral Tablet  Losartan Potassium 25 MG Oral Tablet  metFORMIN HCl - 500 MG Oral Tablet  methylPREDNISolone 4 MG Oral Tablet Therapy Pack  Metoprolol Succinate ER 50 MG Oral Tablet Extended Release 24 Hour  Naproxen 375 MG Oral Tablet  NIFEdipine 90 MG TBCR  Nortriptyline HCl - 10 MG Oral Capsule  Pantoprazole Sodium 40 MG Oral Tablet Delayed Release; TAKE 1 TABLET DAILY  PEG-3350/Electrolytes 236 GM Oral Solution Reconstituted; MIX AS DIRECTED AND  DRINK OVER 4 HOURS, START AT 4PM  Potassium Chloride 20 MEQ Oral Packet; TAKE 1 PACKET DAILY  PredniSONE 5 MG Oral Tablet  Procrit 24768 UNIT/ML Injection Solution  Rosuvastatin Calcium 5 MG Oral Tablet  Simethicone 125 MG Oral Tablet Chewable; CHEW AND SWALLOW 1 TABLET 4 TIMES  DAILY, AFTER MEALS AND AT BEDTIME  Simethicone 125 MG Oral Tablet Chewable; GIVE 2 CAPSULES (250MG) BY MOUTH 3  TIMES DAILY  Synthroid 75 MCG Oral Tablet  Truxima SOLN           Allergies  Penicillins           ?  Vitals  Vital Signs  ?  Recorded: [id number] 09:45AM  Systolic  130, RUE, Sitting  Diastolic  68, RUE, Sitting  Height  5 ft  Weight  130 lb  BMI Calculated  25.39 kg/m2  BSA Calculated  1.55 m2  Temperature  97 F, Temporal  Heart Rate  59  O2 Saturation  98 %, Room Air  FiO2 Flow Rate  0 L/min, Room Air           ?  Assessment  History of colon polyps (V12.72) (Z86.0100)  Intestinal metaplasia of gastric mucosa (537.89) (K31.A0)  Family history of cancer (V16.9) (Z80.9)  History of anemia (V12.3) (Z86.2)  Positive colorectal cancer screening using Cologuard test (787.7) (R19.5)    History of Weight Loss: The patient previously complained of weight loss but denies any anorexia. The patient admits to losing 25 pounds over the past 12 months. The patient attributes the weight loss to prior dialysis.  Diverticulosis: I recommend a low residue diet and avoid seeds. The patient is to consider a trial of Metamucil once a day for fiber supplementation. The patient is to also consider a trial of a probiotic such as Align once a day.  Internal Hemorrhoids: The patient is to consider a trial of Anusol H. C. suppositories one per rectum nightly and Anusol HC2.5% cream apply to affected area twice a day p.r.n. hemorrhoidal bleeding or pain.  History of Colonic Polyps: The patient has a history of colonic polyps. I recommend a repeat colonoscopy to reassess for colonic polyps. The patient was told of the risks and benefits of the procedure. The patient was told of the risks of perforation, emergency surgery, bleeding, infections and missed lesions. The patient is to be on a clear liquid diet the entire day prior to the procedure. The patient is to complete the entire prescribed bowel prep the day prior to the procedure as directed. The patient is told not to drive, drink alcohol, use recreational drugs, exercise, or work the day of the procedure. The patient was told of the need for an escort to accompany the patient home after the procedure. The patient is aware that the procedure may be cancelled if they fail to follow the directions. The patient agreed and will schedule for the procedure. The patient can take the antihypertensive medication with a sip of water one hour prior to the procedure. The patient is to hold the diabetic medication the day before and the morning of the procedure. The patient is to hold the Farxiga x 3 days prior to the procedure. The patient is to be n.p.o. after midnight and bowel prep was given. The patient is to return for the procedure.  Gastritis: The patient has a history of gastritis. The patient is to avoid nonsteroidal anti-inflammatory drugs and aspirin. I recommend a trial of Famotidine 40 mg twice a day for 3 months for the symptoms.  Intestinal Metaplasia of the Stomach: The patient was found to have intestinal metaplasia of the stomach on prior upper endoscopy. The findings of intestinal metaplasia of the stomach have an increased risk of gastric cancer. Prior biopsies did not reveal dysplasia. I recommend a repeat upper endoscopy with mapping to reassess for intestinal metaplasia and dysplasia unless symptomatic. The patient is aware of the increased risk of intestinal metaplasia and progression to stomach cancer. The patient agrees will followup.  Upper Endoscopy: I recommend an upper endoscopy to assess for peptic ulcer disease versus esophagitis. The patient was told of the risks and benefits of the procedure. The patient was told of the risks of perforation, emergency surgery, bleeding, infections and missed lesions. The patient is told not to drive, drink alcohol, use recreational drugs, exercise, or work the day of the procedure. The patient was told of the need for an escort to accompany the patient home after the procedure. The patient is aware that the procedure may be cancelled if they fail to follow the directions. The patient agreed and will schedule for the procedure. The patient can take the antihypertensive medication with a sip of water one hour prior to the procedure. The patient is to hold the diabetic medication the day the morning of the procedure. The patient is to hold the Farxiga x 3 days prior to the procedure. The patient is to be n.p.o. after midnight. The patient is to return for the procedure.  Reflux Esophagitis: The patient had reflux esophagitis noted on prior upper endoscopy. The patient was advised to avoid late-night meals and dietary indiscretions. The patient was advised to avoid fried and fatty foods. The patient was advised to abide by an anti-GERD diet. The patient was given a pamphlet for anti-GERD. The patient and I reviewed the anti-GERD diet at length.  Gastric Polyps: The patient was found to have gastric polyps on recent upper endoscopy. The pathology revealed fundic gland polyps. The patient and I discussed the risks of fundic gland polyps. The patient was told of the possibility of increasing size and bleeding secondary to gastric polyps. The patient was advised to follow up in the office to reassess the gastric polyps.  History of Iron Deficiency Anemia: The patient was previously found to have anemia on prior blood work. The upper endoscopy and colonoscopy were previously inconclusive of the etiology of the anemia. The patient denies any bright red blood per rectum, melena or hematemesis. I recommend following to hemoglobin/hematocrit level. I recommend sending stool guaiac to assess for occult blood in the stool. The patient was also advised to follow up with her hematologist regarding the anemia for iron infusions. If the anemia persists and if repeat upper endoscopy and colonoscopy are unremarkable, the patient may require a capsule endoscopy and possible double balloon enteroscopy to assess for obscure GI bleeding.  Hiatal Hernia: The patient was advised to avoid late-night meals and dietary indiscretions. The patient was advised to avoid fried and fatty foods. The patient was advised to abide by an anti-GERD diet. The patient was given a pamphlet for anti-GERD. The patient and I reviewed the anti-GERD diet at length.  Family History of GI Malignancy: The patient admits to a family history of GI problems. The patient's father had a history of colon cancer. I recommend a repeat colonoscopy in 6 months to reassess for colonic polyps unless symptomatic. The patient agreed and will follow up for the procedure.  I recommend blood work to reassess the anemia. The patient remains anemic and is being evaluated by Dr. Newton Torres of hematology. She previously underwent an iron infusion.  Blood Work: I recommend blood work to assess the patient's symptoms. I recommend a CBC, SMA 24, amylase, lipase, ESR, TFTs, CEA level, iron, TIBC, ferritin level and lipid profile. I also recommend obtaining the recent blood work performed by the patient's PMD.  Follow-up: The patient is to follow-up in the office in 1 month to reassess the symptoms. The patient was told to call the office if any further problems.            ?  Plan  History of anemia  Carcinoembryonic Antigen; Status:Active; Requested for:[id number];  Complete Blood Count; Status:Active; Requested for:[id number];  Comprehensive Metabolic Panel; Status:Active; Requested for:[id number];  Ferritin; Status:Active; Requested for:[id number];  Gamma Glutamyl Transferase, Serum; Status:Active; Requested for:[id number];  Iron with Total Binding Capacity; Status:Active; Requested for:[id number];  Lipase; Status:Active; Requested for:[id number];  Sedimentation Rate, Erythrocyte; Status:Active; Requested for:[id number];  Positive colorectal cancer screening using Cologuard test  Start: PEG-3350/Electrolytes 236 GM Oral Solution Reconstituted; MIX AS DIRECTED  AND DRINK OVER 4 HOURS, START AT 4PM           ?

## 2025-03-06 LAB — SURGICAL PATHOLOGY STUDY: SIGNIFICANT CHANGE UP

## 2025-04-14 ENCOUNTER — APPOINTMENT (OUTPATIENT)
Dept: GASTROENTEROLOGY | Facility: CLINIC | Age: 80
End: 2025-04-14
Payer: MEDICARE

## 2025-04-14 VITALS
OXYGEN SATURATION: 98 % | HEIGHT: 60 IN | DIASTOLIC BLOOD PRESSURE: 71 MMHG | SYSTOLIC BLOOD PRESSURE: 135 MMHG | BODY MASS INDEX: 25.52 KG/M2 | TEMPERATURE: 97.9 F | HEART RATE: 72 BPM | WEIGHT: 130 LBS

## 2025-04-14 DIAGNOSIS — Z86.2 PERSONAL HISTORY OF DISEASES OF THE BLOOD AND BLOOD-FORMING ORGANS AND CERTAIN DISORDERS INVOLVING THE IMMUNE MECHANISM: ICD-10-CM

## 2025-04-14 DIAGNOSIS — Z87.19 PERSONAL HISTORY OF OTHER DISEASES OF THE DIGESTIVE SYSTEM: ICD-10-CM

## 2025-04-14 DIAGNOSIS — K29.30 CHRONIC SUPERFICIAL GASTRITIS W/OUT BLEEDING: ICD-10-CM

## 2025-04-14 DIAGNOSIS — D12.6 BENIGN NEOPLASM OF COLON, UNSPECIFIED: ICD-10-CM

## 2025-04-14 DIAGNOSIS — K44.9 DIAPHRAGMATIC HERNIA W/OUT OBSTRUCTION OR GANGRENE: ICD-10-CM

## 2025-04-14 DIAGNOSIS — K57.90 DIVERTICULOSIS OF INTESTINE, PART UNSPECIFIED, W/OUT PERFORATION OR ABSCESS W/OUT BLEEDING: ICD-10-CM

## 2025-04-14 DIAGNOSIS — K64.8 OTHER HEMORRHOIDS: ICD-10-CM

## 2025-04-14 DIAGNOSIS — K31.A0 GASTRIC INTESTINAL METAPLASIA, UNSPECIFIED: ICD-10-CM

## 2025-04-14 DIAGNOSIS — Z86.0100 PERSONAL HISTORY OF COLON POLYPS, UNSPECIFIED: ICD-10-CM

## 2025-04-14 DIAGNOSIS — Z80.9 FAMILY HISTORY OF MALIGNANT NEOPLASM, UNSPECIFIED: ICD-10-CM

## 2025-04-14 PROCEDURE — 99214 OFFICE O/P EST MOD 30 MIN: CPT

## 2025-04-14 RX ORDER — PANTOPRAZOLE 40 MG/1
40 TABLET, DELAYED RELEASE ORAL DAILY
Qty: 30 | Refills: 3 | Status: ACTIVE | COMMUNITY
Start: 2025-04-14 | End: 1900-01-01

## 2025-04-14 RX ORDER — SODIUM BICARBONATE 650 MG/1
650 TABLET ORAL
Refills: 0 | Status: ACTIVE | COMMUNITY

## 2025-04-14 RX ORDER — HYDROCORTISONE 25 MG/G
2.5 CREAM TOPICAL 3 TIMES DAILY
Qty: 2 | Refills: 3 | Status: ACTIVE | COMMUNITY
Start: 2025-04-14 | End: 1900-01-01

## (undated) DEVICE — RETRIEVER ROTH NET PLATINUM-UNIVERSAL

## (undated) DEVICE — VALVE ENDO SURESEAL II 0-5FR

## (undated) DEVICE — CLAMP BX HOT RAD JAW 3

## (undated) DEVICE — DRSG CURITY GAUZE SPONGE 4 X 4" 12-PLY

## (undated) DEVICE — BITE BLOCK ADULT 20 X 27MM (GREEN)

## (undated) DEVICE — LUBRICATING JELLY ONESHOT 1.25OZ

## (undated) DEVICE — TUBING MEDI-VAC W MAXIGRIP CONNECTORS 1/4"X6'

## (undated) DEVICE — CATH ELCTR GLIDE PRB 7FR

## (undated) DEVICE — SNARE LOOP POLY DISP 30MM LOOP

## (undated) DEVICE — KIT ENDO PROCEDURE CUST W/VLV

## (undated) DEVICE — SENSOR O2 FINGER ADULT

## (undated) DEVICE — VENODYNE/SCD SLEEVE CALF MEDIUM

## (undated) DEVICE — TUBING CANNULA SALTER LABS NASAL ADULT 7FT

## (undated) DEVICE — TUBING IV SET GRAVITY 3Y 100" MACRO

## (undated) DEVICE — ADAPTER ENDO CHNL SINGLE USE

## (undated) DEVICE — Device

## (undated) DEVICE — FORCEP BIOPSY 2.5MM DISP

## (undated) DEVICE — SNARE CAPTIVATOR II 15MM

## (undated) DEVICE — NDL INJ SCLERO INTERJECT 23G

## (undated) DEVICE — SYR LUER LOK 50CC

## (undated) DEVICE — FORCEP RADIAL JAW 4 W NDL 2.2MM 2.8MM 240CM ORANGE DISP

## (undated) DEVICE — SOLIDIFIER ISOLYZER 2000CC

## (undated) DEVICE — MASK O2 ADLT POM ELITE W/ MED AND HI CONCEN M TO M 10FT

## (undated) DEVICE — STERIS DEFENDO 3-PIECE KIT (AIR/WATER, SUCTION & BIOPSY VALVES)

## (undated) DEVICE — SOL INJ NS 0.9% 500ML 1-PORT

## (undated) DEVICE — ELCTR GROUNDING PAD ADULT COVIDIEN

## (undated) DEVICE — FORMALIN CONTAINER MED